# Patient Record
Sex: FEMALE | Race: WHITE | NOT HISPANIC OR LATINO | Employment: OTHER | URBAN - METROPOLITAN AREA
[De-identification: names, ages, dates, MRNs, and addresses within clinical notes are randomized per-mention and may not be internally consistent; named-entity substitution may affect disease eponyms.]

---

## 2017-06-13 ENCOUNTER — TRANSCRIBE ORDERS (OUTPATIENT)
Dept: ADMINISTRATIVE | Facility: HOSPITAL | Age: 52
End: 2017-06-13

## 2017-06-13 ENCOUNTER — HOSPITAL ENCOUNTER (OUTPATIENT)
Dept: CARDIOLOGY | Facility: HOSPITAL | Age: 52
Discharge: HOME | End: 2017-06-13
Attending: SURGERY | Admitting: SURGERY

## 2017-06-13 DIAGNOSIS — Z01.818 PRE-OP EXAMINATION: Primary | ICD-10-CM

## 2017-06-13 DIAGNOSIS — Z01.818 PRE-OP EXAMINATION: ICD-10-CM

## 2017-06-13 PROCEDURE — 93005 ELECTROCARDIOGRAM TRACING: CPT | Performed by: SURGERY

## 2017-06-13 PROCEDURE — 93010 ELECTROCARDIOGRAM REPORT: CPT | Performed by: INTERNAL MEDICINE

## 2020-12-16 ENCOUNTER — TELEPHONE (OUTPATIENT)
Dept: INTERNAL MEDICINE | Facility: CLINIC | Age: 55
End: 2020-12-16

## 2021-02-26 RX ORDER — PHENTERMINE HYDROCHLORIDE 37.5 MG/1
37.5 TABLET ORAL DAILY
OUTPATIENT
Start: 2021-02-26

## 2021-03-01 RX ORDER — PHENTERMINE HYDROCHLORIDE 37.5 MG/1
37.5 TABLET ORAL DAILY
OUTPATIENT
Start: 2021-03-01

## 2021-03-01 NOTE — TELEPHONE ENCOUNTER
Caller: Jessa Chang    Relationship: Self    Best call back number: [phone]    Medication needed:   Requested Prescriptions     Pending Prescriptions Disp Refills   • phentermine (ADIPEX-P) 37.5 MG tablet        Sig: Take 1 tablet by mouth Daily.       When do you need the refill by: ASAP    What details did the patient provide when requesting the medication: PT STATED SHE HAS 1 DAY LEFT. PT STA THAT SHE IS SCHEDULED FOR AN APPT ON 3-24-21 AND SHE JUST NEEDS IT FOR 1 MORE MONTH    Does the patient have less than a 3 day supply:  [x] Yes  [] No    What is the patient's preferred pharmacy: [facility]/PHARMACY #[id number] [place], [place] - [address] AT [facility] [phone] [facility] [phone]

## 2021-03-03 ENCOUNTER — TELEMEDICINE (OUTPATIENT)
Dept: INTERNAL MEDICINE | Facility: CLINIC | Age: 56
End: 2021-03-03

## 2021-03-03 DIAGNOSIS — E78.2 MIXED HYPERLIPIDEMIA: Primary | ICD-10-CM

## 2021-03-03 DIAGNOSIS — R63.5 WEIGHT GAIN: ICD-10-CM

## 2021-03-03 PROCEDURE — 99214 OFFICE O/P EST MOD 30 MIN: CPT | Performed by: INTERNAL MEDICINE

## 2021-03-03 RX ORDER — PHENTERMINE HYDROCHLORIDE 37.5 MG/1
37.5 TABLET ORAL DAILY
Qty: 30 TABLET | Refills: 0 | Status: SHIPPED | OUTPATIENT
Start: 2021-03-03 | End: 2021-03-24 | Stop reason: SDUPTHER

## 2021-03-03 NOTE — PROGRESS NOTES
Chief Complaint  No chief complaint on file.    Annamaria Chang presents to [facility]  History of Present Illness  Overall things are going pretty well.  She is still having some difficulty with weight gain.  She had Covid several months ago and got pretty ill from that.  It took several weeks to recover.  She has gained weight with the Covid quarantine.  She is interested in doing the phentermine again which she has done well in the past.  Her blood pressures been good at home, she has a  with chronic kidney disease and she is familiar with testing the blood pressure.  I did encourage her to come in for physical at the end of the month because we need to monitor this and her lab work.You have chosen to receive care through a telephone visit.You have chosen to receive care through a telehealth visit.  Do you consent to use a video/audio connection for your medical care today? Yes.    Objective   Vital Signs:   There were no vitals taken for this visit.    Physical Exam very nice lady in no acute distress.  Conversive and understanding of goal of treatment  Result Review : Reviewed previous records from [facility]                Assessment and Plan weight gain.  Underlying hyperlipidemia.  Blood pressure is good at home.  Phentermine prescription given.  She is aware of the potential risk and has taken in the past.  She has a physical scheduled for March 24.  Need to get her lab work and other care gaps completed.  Part of this note may be an electronic transcription/translation of spoken language to printed text using the Dragon Dictation System  Diagnoses and all orders for this visit:    1. Mixed hyperlipidemia (Primary)  -     phentermine (ADIPEX-P) 37.5 MG tablet; Take 1 tablet by mouth Daily.  Dispense: 30 tablet; Refill: 0    2. Weight gain        Follow Up   Return if symptoms worsen or fail to improve.  Patient was given instructions  and counseling regarding her condition or for health maintenance advice. Please see specific information pulled into the AVS if appropriate.

## 2021-03-12 RX ORDER — LAMOTRIGINE 100 MG/1
TABLET ORAL
Qty: 75 TABLET | Refills: 3 | Status: SHIPPED | OUTPATIENT
Start: 2021-03-12 | End: 2021-11-01

## 2021-03-24 ENCOUNTER — OFFICE VISIT (OUTPATIENT)
Dept: INTERNAL MEDICINE | Facility: CLINIC | Age: 56
End: 2021-03-24

## 2021-03-24 VITALS
BODY MASS INDEX: 28.32 KG/M2 | TEMPERATURE: 97.8 F | RESPIRATION RATE: 16 BRPM | DIASTOLIC BLOOD PRESSURE: 80 MMHG | SYSTOLIC BLOOD PRESSURE: 122 MMHG | HEART RATE: 97 BPM | HEIGHT: 64 IN | OXYGEN SATURATION: 96 %

## 2021-03-24 DIAGNOSIS — K21.9 GASTROESOPHAGEAL REFLUX DISEASE WITHOUT ESOPHAGITIS: ICD-10-CM

## 2021-03-24 DIAGNOSIS — Z12.11 COLON CANCER SCREENING: ICD-10-CM

## 2021-03-24 DIAGNOSIS — Z00.00 ROUTINE GENERAL MEDICAL EXAMINATION AT A HEALTH CARE FACILITY: ICD-10-CM

## 2021-03-24 DIAGNOSIS — R63.5 WEIGHT GAIN: ICD-10-CM

## 2021-03-24 DIAGNOSIS — E78.2 MIXED HYPERLIPIDEMIA: Primary | ICD-10-CM

## 2021-03-24 PROCEDURE — 99214 OFFICE O/P EST MOD 30 MIN: CPT | Performed by: INTERNAL MEDICINE

## 2021-03-24 RX ORDER — METHYLPREDNISOLONE 4 MG/1
TABLET ORAL
Qty: 1 EACH | Refills: 0 | Status: SHIPPED | OUTPATIENT
Start: 2021-03-24 | End: 2021-09-17 | Stop reason: ALTCHOICE

## 2021-03-24 RX ORDER — PANTOPRAZOLE SODIUM 40 MG/1
40 TABLET, DELAYED RELEASE ORAL DAILY
Qty: 30 TABLET | Refills: 1 | Status: SHIPPED | OUTPATIENT
Start: 2021-03-24 | End: 2021-04-18

## 2021-03-24 RX ORDER — PHENTERMINE HYDROCHLORIDE 37.5 MG/1
37.5 TABLET ORAL DAILY
Qty: 30 TABLET | Refills: 0 | Status: SHIPPED | OUTPATIENT
Start: 2021-03-24 | End: 2021-04-05

## 2021-03-24 NOTE — PROGRESS NOTES
"Chief Complaint  Follow-up (weight loss, covid )    Subjective          Jessa Chang presents to [facility]  History of Present Illness  Denies any significant headache, shortness of breath or chest pain.  No visual changes.  Taking medication without complication.  She has a lot of different concerns.  Right lower lumbar radicular symptoms.  She is noticed over the past few days.  Was not interested in any imaging at this point  Reflux symptoms.  She is been working on weight loss and juicing a lot.  No postprandial pain or right upper quadrant pain.  Taking some Pepcid occasionally  Has been working on weight loss with the phentermine and dietary changes.    Objective   Vital Signs:   /80 (BP Location: Left arm, Patient Position: Sitting, Cuff Size: Large Adult)   Pulse 97   Temp 97.8 °F (36.6 °C) (Temporal)   Resp 16   Ht 162.6 cm (64\")   SpO2 96%   BMI 28.32 kg/m²     Physical Exam  Vitals and nursing note reviewed.   Constitutional:       Appearance: Normal appearance.   HENT:      Head: Normocephalic and atraumatic.   Cardiovascular:      Rate and Rhythm: Normal rate and regular rhythm.   Pulmonary:      Effort: Pulmonary effort is normal.      Breath sounds: Normal breath sounds.   Abdominal:      General: Abdomen is flat.      Palpations: Abdomen is soft.   Musculoskeletal:         General: No swelling or deformity.      Cervical back: Neck supple.      Right lower leg: No edema.      Left lower leg: No edema.   Skin:     General: Skin is warm.      Capillary Refill: Capillary refill takes less than 2 seconds.      Findings: No rash.   Neurological:      General: No focal deficit present.      Mental Status: She is alert and oriented to person, place, and time.        Result Review :                 Assessment and Plan reflux symptoms.  Does not sound biliary in nature but certainly if does not improve we will schedule an ultrasound of the " gallbladder.  Pantoprazole 40 mg daily and recheck in 4 weeks  Weight gain.  She is done well on the phentermine.  She is juicing and has lost quite a bit of weight here in the past month.  Mild blood pressure elevation today but not concerned to need to change the medications.  Recheck in 2 months  Lumbar radicular symptoms.  We talked about exercises.  She was not interested in imaging at this point but if it persist would certainly pursue that.  Medrol Dosepak given.  Follow-up if it does not improve.  She talked about seeing the orthopedist as well which either way is fine but would not let it go untreated or undamaged if it persist.  Schedule colonoscopy.  Reportedly had a mammogram per GYN  Diagnoses and all orders for this visit:    1. Mixed hyperlipidemia (Primary)  -     Hepatitis C Antibody  -     CBC & Differential  -     Comprehensive Metabolic Panel  -     TSH  -     Lipid Panel With / Chol / HDL Ratio  -     Vitamin D 25 Hydroxy  -     Hemoglobin A1c  -     phentermine (ADIPEX-P) 37.5 MG tablet; Take 1 tablet by mouth Daily.  Dispense: 30 tablet; Refill: 0    2. Weight gain  -     Hepatitis C Antibody  -     CBC & Differential  -     Comprehensive Metabolic Panel  -     TSH  -     Lipid Panel With / Chol / HDL Ratio  -     Vitamin D 25 Hydroxy  -     Hemoglobin A1c    3. Gastroesophageal reflux disease without esophagitis  -     Hepatitis C Antibody  -     CBC & Differential  -     Comprehensive Metabolic Panel  -     TSH  -     Lipid Panel With / Chol / HDL Ratio  -     Vitamin D 25 Hydroxy  -     Hemoglobin A1c    4. Routine general medical examination at a health care facility  -     Hepatitis C Antibody  -     CBC & Differential  -     Comprehensive Metabolic Panel  -     TSH  -     Lipid Panel With / Chol / HDL Ratio  -     Vitamin D 25 Hydroxy  -     Hemoglobin A1c    Other orders  -     methylPREDNISolone (MEDROL) 4 MG dose pack; Take as directed on package instructions.  Dispense: 1 each;  Refill: 0  -     pantoprazole (Protonix) 40 MG EC tablet; Take 1 tablet by mouth Daily.  Dispense: 30 tablet; Refill: 1        Follow Up   No follow-ups on file.  Patient was given instructions and counseling regarding her condition or for health maintenance advice. Please see specific information pulled into the AVS if appropriate.

## 2021-03-25 LAB
25(OH)D3+25(OH)D2 SERPL-MCNC: 33.6 NG/ML (ref 30–100)
ALBUMIN SERPL-MCNC: 4.5 G/DL (ref 3.5–5.2)
ALBUMIN/GLOB SERPL: 1.7 G/DL
ALP SERPL-CCNC: 97 U/L (ref 39–117)
ALT SERPL-CCNC: 25 U/L (ref 1–33)
AST SERPL-CCNC: 20 U/L (ref 1–32)
BASOPHILS # BLD AUTO: 0.07 10*3/MM3 (ref 0–0.2)
BASOPHILS NFR BLD AUTO: 0.8 % (ref 0–1.5)
BILIRUB SERPL-MCNC: 0.2 MG/DL (ref 0–1.2)
BUN SERPL-MCNC: 10 MG/DL (ref 6–20)
BUN/CREAT SERPL: 11.9 (ref 7–25)
CALCIUM SERPL-MCNC: 9.9 MG/DL (ref 8.6–10.5)
CHLORIDE SERPL-SCNC: 103 MMOL/L (ref 98–107)
CHOLEST SERPL-MCNC: 241 MG/DL (ref 0–200)
CHOLEST/HDLC SERPL: 4.82 {RATIO}
CO2 SERPL-SCNC: 25.9 MMOL/L (ref 22–29)
CREAT SERPL-MCNC: 0.84 MG/DL (ref 0.57–1)
EOSINOPHIL # BLD AUTO: 0.24 10*3/MM3 (ref 0–0.4)
EOSINOPHIL NFR BLD AUTO: 2.9 % (ref 0.3–6.2)
ERYTHROCYTE [DISTWIDTH] IN BLOOD BY AUTOMATED COUNT: 12.8 % (ref 12.3–15.4)
GLOBULIN SER CALC-MCNC: 2.6 GM/DL
GLUCOSE SERPL-MCNC: 87 MG/DL (ref 65–99)
HBA1C MFR BLD: 5.5 % (ref 4.8–5.6)
HCT VFR BLD AUTO: 40.5 % (ref 34–46.6)
HCV AB S/CO SERPL IA: <0.1 S/CO RATIO (ref 0–0.9)
HDLC SERPL-MCNC: 50 MG/DL (ref 40–60)
HGB BLD-MCNC: 13.8 G/DL (ref 12–15.9)
IMM GRANULOCYTES # BLD AUTO: 0.02 10*3/MM3 (ref 0–0.05)
IMM GRANULOCYTES NFR BLD AUTO: 0.2 % (ref 0–0.5)
LDLC SERPL CALC-MCNC: 162 MG/DL (ref 0–100)
LYMPHOCYTES # BLD AUTO: 2.96 10*3/MM3 (ref 0.7–3.1)
LYMPHOCYTES NFR BLD AUTO: 35.9 % (ref 19.6–45.3)
MCH RBC QN AUTO: 32.9 PG (ref 26.6–33)
MCHC RBC AUTO-ENTMCNC: 34.1 G/DL (ref 31.5–35.7)
MCV RBC AUTO: 96.7 FL (ref 79–97)
MONOCYTES # BLD AUTO: 0.8 10*3/MM3 (ref 0.1–0.9)
MONOCYTES NFR BLD AUTO: 9.7 % (ref 5–12)
NEUTROPHILS # BLD AUTO: 4.15 10*3/MM3 (ref 1.7–7)
NEUTROPHILS NFR BLD AUTO: 50.5 % (ref 42.7–76)
NRBC BLD AUTO-RTO: 0 /100 WBC (ref 0–0.2)
PLATELET # BLD AUTO: 213 10*3/MM3 (ref 140–450)
POTASSIUM SERPL-SCNC: 4.1 MMOL/L (ref 3.5–5.2)
PROT SERPL-MCNC: 7.1 G/DL (ref 6–8.5)
RBC # BLD AUTO: 4.19 10*6/MM3 (ref 3.77–5.28)
SODIUM SERPL-SCNC: 136 MMOL/L (ref 136–145)
TRIGL SERPL-MCNC: 161 MG/DL (ref 0–150)
TSH SERPL DL<=0.005 MIU/L-ACNC: 1.16 UIU/ML (ref 0.27–4.2)
VLDLC SERPL CALC-MCNC: 29 MG/DL (ref 5–40)
WBC # BLD AUTO: 8.24 10*3/MM3 (ref 3.4–10.8)

## 2021-04-04 DIAGNOSIS — E78.2 MIXED HYPERLIPIDEMIA: ICD-10-CM

## 2021-04-05 RX ORDER — PHENTERMINE HYDROCHLORIDE 37.5 MG/1
TABLET ORAL
Qty: 30 TABLET | Refills: 0 | Status: SHIPPED | OUTPATIENT
Start: 2021-04-05 | End: 2022-02-24

## 2021-04-18 RX ORDER — PANTOPRAZOLE SODIUM 40 MG/1
TABLET, DELAYED RELEASE ORAL
Qty: 30 TABLET | Refills: 1 | Status: SHIPPED | OUTPATIENT
Start: 2021-04-18 | End: 2021-05-28

## 2021-05-24 ENCOUNTER — PREP FOR SURGERY (OUTPATIENT)
Dept: SURGERY | Facility: SURGERY CENTER | Age: 56
End: 2021-05-24

## 2021-05-24 ENCOUNTER — OFFICE VISIT (OUTPATIENT)
Dept: GASTROENTEROLOGY | Facility: CLINIC | Age: 56
End: 2021-05-24

## 2021-05-24 VITALS
DIASTOLIC BLOOD PRESSURE: 70 MMHG | WEIGHT: 171.8 LBS | TEMPERATURE: 97.3 F | RESPIRATION RATE: 16 BRPM | SYSTOLIC BLOOD PRESSURE: 122 MMHG | OXYGEN SATURATION: 99 % | HEIGHT: 65 IN | HEART RATE: 105 BPM | BODY MASS INDEX: 28.62 KG/M2

## 2021-05-24 DIAGNOSIS — R11.2 NAUSEA AND VOMITING, INTRACTABILITY OF VOMITING NOT SPECIFIED, UNSPECIFIED VOMITING TYPE: ICD-10-CM

## 2021-05-24 DIAGNOSIS — R19.7 DIARRHEA, UNSPECIFIED TYPE: ICD-10-CM

## 2021-05-24 DIAGNOSIS — K21.9 GASTROESOPHAGEAL REFLUX DISEASE, UNSPECIFIED WHETHER ESOPHAGITIS PRESENT: ICD-10-CM

## 2021-05-24 DIAGNOSIS — R13.19 ESOPHAGEAL DYSPHAGIA: Primary | ICD-10-CM

## 2021-05-24 DIAGNOSIS — K21.9 GASTROESOPHAGEAL REFLUX DISEASE, UNSPECIFIED WHETHER ESOPHAGITIS PRESENT: Primary | Chronic | ICD-10-CM

## 2021-05-24 DIAGNOSIS — K59.09 CHRONIC CONSTIPATION: ICD-10-CM

## 2021-05-24 DIAGNOSIS — R13.19 ESOPHAGEAL DYSPHAGIA: ICD-10-CM

## 2021-05-24 DIAGNOSIS — Z12.11 COLON CANCER SCREENING: ICD-10-CM

## 2021-05-24 PROCEDURE — 99204 OFFICE O/P NEW MOD 45 MIN: CPT | Performed by: INTERNAL MEDICINE

## 2021-05-24 RX ORDER — SODIUM CHLORIDE 0.9 % (FLUSH) 0.9 %
3 SYRINGE (ML) INJECTION EVERY 12 HOURS SCHEDULED
Status: CANCELLED | OUTPATIENT
Start: 2021-05-24

## 2021-05-24 RX ORDER — SODIUM CHLORIDE, SODIUM LACTATE, POTASSIUM CHLORIDE, CALCIUM CHLORIDE 600; 310; 30; 20 MG/100ML; MG/100ML; MG/100ML; MG/100ML
30 INJECTION, SOLUTION INTRAVENOUS CONTINUOUS PRN
Status: CANCELLED | OUTPATIENT
Start: 2021-05-24

## 2021-05-24 RX ORDER — SODIUM CHLORIDE 0.9 % (FLUSH) 0.9 %
10 SYRINGE (ML) INJECTION AS NEEDED
Status: CANCELLED | OUTPATIENT
Start: 2021-05-24

## 2021-05-24 NOTE — PROGRESS NOTES
"Chief Complaint   Patient presents with   • Nausea   • Vomiting     History of Present Illness  Patient here for consultation for dysphagia, GERD, constipation and diarrhea. She vomits daily. She feels reflux.  EGD and colonoscopy 8 years ago negative. Also with epigastric pain.         Result Review :       CT ABDOMEN PELVIS WITH CONTRAST (01/31/2014 10:56)  Comprehensive Metabolic Panel (03/24/2021 14:38)  CBC & Differential (03/24/2021 14:38)      Vital Signs:   /70   Pulse 105   Temp 97.3 °F (36.3 °C)   Resp 16   Ht 165.1 cm (65\")   Wt 77.9 kg (171 lb 12.8 oz)   SpO2 99%   BMI 28.59 kg/m²     Body mass index is 28.59 kg/m².     Physical Exam  Vitals reviewed.   Constitutional:       Appearance: Normal appearance.   Abdominal:      General: Bowel sounds are normal. There is no distension.      Palpations: Abdomen is soft. Abdomen is not rigid. There is no mass or pulsatile mass.      Tenderness: There is no abdominal tenderness. There is no guarding or rebound.           Assessment and Plan    Diagnoses and all orders for this visit:    1. Gastroesophageal reflux disease, unspecified whether esophagitis present (Primary)  Comments:  chronic and worsening problem    2. Esophageal dysphagia  Comments:  New and undiagnosed problem    3. Diarrhea, unspecified type    4. Chronic constipation    5. Nausea and vomiting, intractability of vomiting not specified, unspecified vomiting type         BRIEF SUMMARY  Patient for consultation.  She is having new episodes of nausea and vomiting as well as new complaints of dysphagia.  These happen all day long.  She also has pain in the epigastric region as well as left lower quadrant on occasion.  She had as have a history of chronic constipation and takes MiraLAX.  She does have some intermittent diarrhea but this is infrequent.  We will proceed with an EGD and colonoscopy for further evaluation.  She also has a family history of gallbladder issues with her nausea " and vomiting we will check an ultrasound of her gallbladder.  If all is negative, consider cross-sectional imaging with CT but doubt pancreatic etiology or other cause at this point.    I have reviewed and confirmed the accuracy of the HPI and Assessment and Plan as documented by the APRN NALLELY Ramirez        Follow Up   No follow-ups on file.    There are no Patient Instructions on file for this visit.

## 2021-05-28 RX ORDER — PANTOPRAZOLE SODIUM 40 MG/1
TABLET, DELAYED RELEASE ORAL
Qty: 90 TABLET | Refills: 1 | Status: SHIPPED | OUTPATIENT
Start: 2021-05-28 | End: 2021-12-09

## 2021-06-09 ENCOUNTER — TELEPHONE (OUTPATIENT)
Dept: GASTROENTEROLOGY | Facility: CLINIC | Age: 56
End: 2021-06-09

## 2021-06-11 ENCOUNTER — OUTSIDE FACILITY SERVICE (OUTPATIENT)
Dept: GASTROENTEROLOGY | Facility: CLINIC | Age: 56
End: 2021-06-11

## 2021-06-11 PROCEDURE — 43239 EGD BIOPSY SINGLE/MULTIPLE: CPT | Performed by: INTERNAL MEDICINE

## 2021-06-11 PROCEDURE — 45380 COLONOSCOPY AND BIOPSY: CPT | Performed by: INTERNAL MEDICINE

## 2021-06-18 DIAGNOSIS — R85.7 ABNORMAL SMALL BOWEL BIOPSY: Primary | ICD-10-CM

## 2021-06-30 ENCOUNTER — TELEPHONE (OUTPATIENT)
Dept: GASTROENTEROLOGY | Facility: CLINIC | Age: 56
End: 2021-06-30

## 2021-07-26 ENCOUNTER — PATIENT MESSAGE (OUTPATIENT)
Dept: GASTROENTEROLOGY | Facility: CLINIC | Age: 56
End: 2021-07-26

## 2021-09-17 ENCOUNTER — OFFICE VISIT (OUTPATIENT)
Dept: INTERNAL MEDICINE | Facility: CLINIC | Age: 56
End: 2021-09-17

## 2021-09-17 VITALS
RESPIRATION RATE: 16 BRPM | HEIGHT: 65 IN | HEART RATE: 89 BPM | BODY MASS INDEX: 28.32 KG/M2 | SYSTOLIC BLOOD PRESSURE: 144 MMHG | TEMPERATURE: 96.9 F | WEIGHT: 170 LBS | OXYGEN SATURATION: 97 % | DIASTOLIC BLOOD PRESSURE: 86 MMHG

## 2021-09-17 DIAGNOSIS — K58.2 IRRITABLE BOWEL SYNDROME WITH BOTH CONSTIPATION AND DIARRHEA: ICD-10-CM

## 2021-09-17 DIAGNOSIS — F41.9 ANXIETY: ICD-10-CM

## 2021-09-17 DIAGNOSIS — F33.9 EPISODE OF RECURRENT MAJOR DEPRESSIVE DISORDER, UNSPECIFIED DEPRESSION EPISODE SEVERITY (HCC): ICD-10-CM

## 2021-09-17 DIAGNOSIS — K21.9 GASTROESOPHAGEAL REFLUX DISEASE WITHOUT ESOPHAGITIS: ICD-10-CM

## 2021-09-17 DIAGNOSIS — R10.11 RUQ PAIN: Primary | ICD-10-CM

## 2021-09-17 PROCEDURE — 99214 OFFICE O/P EST MOD 30 MIN: CPT | Performed by: INTERNAL MEDICINE

## 2021-09-17 RX ORDER — PROMETHAZINE HYDROCHLORIDE 25 MG/1
1 TABLET ORAL DAILY
COMMUNITY

## 2021-09-17 NOTE — PROGRESS NOTES
"Chief Complaint  Abdominal Pain (follow up ), Anxiety (rf meds ), and Depression    Subjective          Jessa Chang presents to [facility]  History of Present Illness  Persistent bloating and dyspepsia.  Intermittent diarrhea with periods of constipation.  Certainly a long history of IBS.  She had a EGD and colonoscopy which were essentially negative.  We had talked about getting an right upper quadrant ultrasound and the GI doctor talk to her about that as well  History of depression on fluoxetine and lamotrigine and occasional diazepam.  She previously got these through her psychiatrist but wants to change it over as her psychiatrist is moving.    Objective   Vital Signs:   /86 (BP Location: Left arm, Patient Position: Sitting, Cuff Size: Large Adult)   Pulse 89   Temp 96.9 °F (36.1 °C) (Temporal)   Resp 16   Ht 165.1 cm (65\")   Wt 77.1 kg (170 lb)   SpO2 97%   BMI 28.29 kg/m²     Physical Exam  Constitutional:       Appearance: Normal appearance.   HENT:      Head: Normocephalic and atraumatic.      Nose: Nose normal.   Eyes:      Pupils: Pupils are equal, round, and reactive to light.   Cardiovascular:      Rate and Rhythm: Normal rate and regular rhythm.      Heart sounds: No murmur heard.   No friction rub. No gallop.    Pulmonary:      Effort: Pulmonary effort is normal.      Breath sounds: Normal breath sounds.   Neurological:      General: No focal deficit present.      Mental Status: She is alert and oriented to person, place, and time.   Psychiatric:         Attention and Perception: She does not perceive auditory or visual hallucinations.         Mood and Affect: Mood normal.         Speech: Speech normal.         Behavior: Behavior normal.         Thought Content: Thought content normal.         Cognition and Memory: Cognition normal.         Judgment: Judgment normal.        Result Review : Reviewed previous labs              "   Assessment and Plan dyspepsia with a history of IBS.  She had a EGD and colonoscopy recently.  Schedule right upper quadrant ultrasound and probably should go ahead and do a HIDA scan if that turns out to be negative.  Surgical referral pending.  IBS history.  We talked about dietary modifications and she is going to try to work on that.  Depression history and anxiety.  She is doing pretty well on the current combination of medication.  Her psychiatrist is retiring and ask if we could take that over which we can.  Would follow-up in 3 months  Diagnoses and all orders for this visit:    1. RUQ pain (Primary)  -     US Gallbladder; Future    2. Gastroesophageal reflux disease without esophagitis    3. Irritable bowel syndrome with both constipation and diarrhea    4. Episode of recurrent major depressive disorder, unspecified depression episode severity (CMS/HCC)    5. Anxiety        Follow Up   Return in about 3 months (around 12/17/2021).  Patient was given instructions and counseling regarding her condition or for health maintenance advice. Please see specific information pulled into the AVS if appropriate.

## 2021-09-30 ENCOUNTER — HOSPITAL ENCOUNTER (OUTPATIENT)
Dept: ULTRASOUND IMAGING | Facility: HOSPITAL | Age: 56
End: 2021-09-30

## 2021-10-14 ENCOUNTER — HOSPITAL ENCOUNTER (OUTPATIENT)
Dept: ULTRASOUND IMAGING | Facility: HOSPITAL | Age: 56
Discharge: HOME | End: 2021-10-14
Admitting: INTERNAL MEDICINE

## 2021-10-14 DIAGNOSIS — R10.11 RUQ PAIN: ICD-10-CM

## 2021-10-14 PROCEDURE — 76705 ECHO EXAM OF ABDOMEN: CPT

## 2021-11-01 RX ORDER — LAMOTRIGINE 100 MG/1
TABLET ORAL
Qty: 225 TABLET | Refills: 0 | Status: SHIPPED | OUTPATIENT
Start: 2021-11-01 | End: 2022-01-02

## 2021-12-09 RX ORDER — PANTOPRAZOLE SODIUM 40 MG/1
TABLET, DELAYED RELEASE ORAL
Qty: 90 TABLET | Refills: 1 | Status: SHIPPED | OUTPATIENT
Start: 2021-12-09 | End: 2022-02-24

## 2022-01-02 RX ORDER — LAMOTRIGINE 100 MG/1
TABLET ORAL
Qty: 225 TABLET | Refills: 0 | Status: SHIPPED | OUTPATIENT
Start: 2022-01-02 | End: 2022-06-29

## 2022-01-03 ENCOUNTER — TELEPHONE (OUTPATIENT)
Dept: INTERNAL MEDICINE | Facility: CLINIC | Age: 57
End: 2022-01-03

## 2022-01-03 DIAGNOSIS — K58.2 IRRITABLE BOWEL SYNDROME WITH BOTH CONSTIPATION AND DIARRHEA: Primary | ICD-10-CM

## 2022-01-03 DIAGNOSIS — F41.9 ANXIETY: ICD-10-CM

## 2022-01-03 RX ORDER — DIAZEPAM 5 MG/1
5 TABLET ORAL 2 TIMES DAILY PRN
Qty: 60 TABLET | Refills: 0 | Status: SHIPPED | OUTPATIENT
Start: 2022-01-03 | End: 2022-05-27 | Stop reason: SDUPTHER

## 2022-01-03 RX ORDER — FLUOXETINE HYDROCHLORIDE 20 MG/1
CAPSULE ORAL
Qty: 270 CAPSULE | Refills: 1 | Status: SHIPPED | OUTPATIENT
Start: 2022-01-03 | End: 2022-01-05 | Stop reason: SDUPTHER

## 2022-01-03 NOTE — TELEPHONE ENCOUNTER
THE PATIENT WILL ALSO NEED THE diazePAM (VALIUM) 5 MG tablet CALLED IN. SHE IS GETTING ON A PLANE FOR WORK THIS UPCOMING Thursday AND WOULD LIKE A REFILL ON BOTH MEDICATIONS. PLEASE ADVISE.

## 2022-01-05 RX ORDER — FLUOXETINE HYDROCHLORIDE 20 MG/1
60 CAPSULE ORAL EVERY MORNING
Qty: 270 CAPSULE | Refills: 1 | Status: SHIPPED | OUTPATIENT
Start: 2022-01-05 | End: 2022-12-27 | Stop reason: SDUPTHER

## 2022-01-05 NOTE — TELEPHONE ENCOUNTER
It was sent in on the third but I resent it.  Will need follow-up for the next Valium refill because greater than 6 months

## 2022-01-05 NOTE — TELEPHONE ENCOUNTER
PATIENT IS CALLING IN SHE STATES PHARMACY IS TELLING HER THEY DID NOT RECEIVE SCRIPT FOR THE FLUOXETINE BUT THEY DID GET THE DIAZEPAM. CAN YOU RESEND PLEASE.      CONFIRMED PHARMACY  [id number] - [place], [place] - [address]. AT [facility] [phone] [facility] [phone]

## 2022-02-24 ENCOUNTER — TELEPHONE (OUTPATIENT)
Dept: INTERNAL MEDICINE | Facility: CLINIC | Age: 57
End: 2022-02-24

## 2022-02-24 NOTE — TELEPHONE ENCOUNTER
PATIENT CALLED AND STATES SHE HAS BRONCHITIS. SHE GETS THIS EVERY YEAR. SHE IS HAVING CHEST CONGESTION, SINUSES ARE CLOGGED, FACE HURTS. NO APPOINTMENTS AVAILABLE TODAY.  THIS STARTED ON Monday AND GOTTEN WORSE YESTERDAY    PLEASE CALL AND ADVISE [phone]    [id number] - [place], [place] - [address] [address]. AT [facility] - [phone]  - [phone]   [phone]

## 2022-05-11 DIAGNOSIS — K58.2 IRRITABLE BOWEL SYNDROME WITH BOTH CONSTIPATION AND DIARRHEA: ICD-10-CM

## 2022-05-11 DIAGNOSIS — F41.9 ANXIETY: ICD-10-CM

## 2022-05-11 RX ORDER — LAMOTRIGINE 100 MG/1
TABLET ORAL
Qty: 225 TABLET | Refills: 0 | OUTPATIENT
Start: 2022-05-11

## 2022-05-11 RX ORDER — DIAZEPAM 5 MG/1
TABLET ORAL
Qty: 60 TABLET | Refills: 0 | OUTPATIENT
Start: 2022-05-11

## 2022-05-27 DIAGNOSIS — F41.9 ANXIETY: ICD-10-CM

## 2022-05-27 DIAGNOSIS — K58.2 IRRITABLE BOWEL SYNDROME WITH BOTH CONSTIPATION AND DIARRHEA: ICD-10-CM

## 2022-05-27 RX ORDER — DIAZEPAM 5 MG/1
5 TABLET ORAL 2 TIMES DAILY PRN
Qty: 10 TABLET | Refills: 0 | Status: SHIPPED | OUTPATIENT
Start: 2022-05-27 | End: 2022-07-15 | Stop reason: SDUPTHER

## 2022-05-27 RX ORDER — LOSARTAN POTASSIUM 25 MG/1
25 TABLET ORAL DAILY
Qty: 30 TABLET | Refills: 0 | Status: SHIPPED | OUTPATIENT
Start: 2022-05-27 | End: 2022-06-29

## 2022-05-27 NOTE — TELEPHONE ENCOUNTER
PATIENT CALLED FOR MEDICATION REFILL OF  diazePAM (VALIUM) 5 MG tablet    SHE IS OUT OF MEDICATION. HER FATHER IN LAW HAS PASSED AWAY AND SHE ENDED UP AT THE [facility]. SHE WAS GIVEN BLOOD PRESSURE MEDICATION   LOSARTAN 25 MG FOR CHEST PAINS    SHE IS REQUESTING A REFILL FOR BOTH MEDICATIONS.     [facility]/pharmacy #[id number] - [place], [place] - [address] [address]. AT [facility] - [phone]  - [phone]   [phone]    CALL BACK NUMBER [phone]

## 2022-05-27 NOTE — TELEPHONE ENCOUNTER
Have sent a short term Rx for valium, she has not been seen in over 6 mos and for controleld substances she has to be seen. Given the recent death in the family, will agree for a few until she can see Dr. Singer on 6/15, but this cannot be refilled again  Her ER visit was at the beginning of April, so we really like to have follow up within the 30 day after those visits which is why they only give 1 month in the ER. Again, have sent 1 month to get her through, but needs to keep this follow up appt next month

## 2022-05-27 NOTE — TELEPHONE ENCOUNTER
Rx Refill Note  Requested Prescriptions     Pending Prescriptions Disp Refills   • diazePAM (VALIUM) 5 MG tablet 60 tablet 0     Sig: Take 1 tablet by mouth 2 (Two) Times a Day As Needed for Anxiety.      Last office visit with prescribing clinician: 9/17/2021      Next office visit with prescribing clinician: Visit date not found            Dora Pittman MA  05/27/22, 15:12 EDT

## 2022-06-29 RX ORDER — LAMOTRIGINE 100 MG/1
TABLET ORAL
Qty: 225 TABLET | Refills: 0 | Status: SHIPPED | OUTPATIENT
Start: 2022-06-29 | End: 2022-09-13 | Stop reason: SDUPTHER

## 2022-06-29 RX ORDER — LOSARTAN POTASSIUM 25 MG/1
TABLET ORAL
Qty: 30 TABLET | Refills: 0 | Status: SHIPPED | OUTPATIENT
Start: 2022-06-29 | End: 2022-07-15 | Stop reason: ALTCHOICE

## 2022-06-29 NOTE — TELEPHONE ENCOUNTER
Caller: CharleneJessaMargarita    Relationship: Self    Best call back number: [phone] (H)    Requested Prescriptions:   Requested Prescriptions     Pending Prescriptions Disp Refills   • lamoTRIgine (LaMICtal) 100 MG tablet [Pharmacy Med Name: LAMOTRIGINE 100 MG TABLET] 225 tablet 0     Sig: TAKE 2 AND 1/2 TABLETS BY MOUTH EVERY MORNING        Pharmacy where request should be sent: [facility]/PHARMACY #[id number] [place], [place] - [address] [facility]. AT [facility] [phone] [facility] [phone]      Additional details provided by patient: PATIENT CALLED TO REQUEST A MEDICATION REFILL ON HER MEDICATION. PATIENT STATES THAT HE HAS A 3 DAY SUPPLY LEFT. PATIENT HAS AN APPOINTMENT SCHEDULED ON 07/15/22 WITH DR. DAVIDSON FOR A MEDICATION REFILL.          Does the patient have less than a 3 day supply:  [] Yes  [x] No    Nani Ruiz Rep   06/29/22 13:52 EDT         THANKS

## 2022-07-15 ENCOUNTER — OFFICE VISIT (OUTPATIENT)
Dept: INTERNAL MEDICINE | Facility: CLINIC | Age: 57
End: 2022-07-15

## 2022-07-15 VITALS
OXYGEN SATURATION: 97 % | HEIGHT: 64 IN | SYSTOLIC BLOOD PRESSURE: 160 MMHG | BODY MASS INDEX: 28.85 KG/M2 | WEIGHT: 169 LBS | HEART RATE: 91 BPM | TEMPERATURE: 97.3 F | RESPIRATION RATE: 16 BRPM | DIASTOLIC BLOOD PRESSURE: 110 MMHG

## 2022-07-15 DIAGNOSIS — K58.2 IRRITABLE BOWEL SYNDROME WITH BOTH CONSTIPATION AND DIARRHEA: ICD-10-CM

## 2022-07-15 DIAGNOSIS — R07.9 CHEST PAIN, UNSPECIFIED TYPE: ICD-10-CM

## 2022-07-15 DIAGNOSIS — Z00.00 ANNUAL PHYSICAL EXAM: ICD-10-CM

## 2022-07-15 DIAGNOSIS — F41.9 ANXIETY: ICD-10-CM

## 2022-07-15 DIAGNOSIS — I16.0 HYPERTENSIVE URGENCY: Primary | ICD-10-CM

## 2022-07-15 DIAGNOSIS — E78.2 MIXED HYPERLIPIDEMIA: ICD-10-CM

## 2022-07-15 DIAGNOSIS — R63.5 WEIGHT GAIN: ICD-10-CM

## 2022-07-15 PROCEDURE — 99214 OFFICE O/P EST MOD 30 MIN: CPT | Performed by: INTERNAL MEDICINE

## 2022-07-15 RX ORDER — LOSARTAN POTASSIUM AND HYDROCHLOROTHIAZIDE 12.5; 5 MG/1; MG/1
1 TABLET ORAL DAILY
Qty: 90 TABLET | Refills: 2 | Status: SHIPPED | OUTPATIENT
Start: 2022-07-15 | End: 2022-12-27 | Stop reason: SDUPTHER

## 2022-07-15 RX ORDER — PANTOPRAZOLE SODIUM 40 MG/1
40 TABLET, DELAYED RELEASE ORAL DAILY
COMMUNITY
Start: 2022-04-06

## 2022-07-15 RX ORDER — DIAZEPAM 5 MG/1
5 TABLET ORAL 2 TIMES DAILY PRN
Qty: 30 TABLET | Refills: 3 | Status: SHIPPED | OUTPATIENT
Start: 2022-07-15 | End: 2022-12-27 | Stop reason: SDUPTHER

## 2022-07-15 RX ORDER — LOSARTAN POTASSIUM 25 MG/1
25 TABLET ORAL DAILY
COMMUNITY
Start: 2022-04-07 | End: 2022-07-15 | Stop reason: ALTCHOICE

## 2022-07-15 NOTE — PROGRESS NOTES
"Chief Complaint  Hypertension    Subjective        Jessa Chang presents to [facility]  History of Present Illness  She has several different concerns.  First is some short-term memory loss.  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  She feels like her executive function is normal.  Poor balance which traditionally she says she has had good balance although has difficulty balancing on 1 foot.  She has not been working on exercise per se and realizes that that may help and has recently signed up for some classes to help with that.  We talked about some other options to work with balance as well.  No ataxia.  No double vision or cerebellar upper extremity findings  Her blood pressures been significantly elevated.  She has gained some weight she noted since last visit.  She is on a low-dose of losartan.  She thinks it is gotten worse since she has had COVID and she is had COVID 3 times.  She is not had any additional work-up for that.  As far as her anxiety is pretty well controlled on the lamotrigine and fluoxetine but she does take Valium and wanted a refill on that.  Objective   Vital Signs:  BP (!) 160/110 (BP Location: Left arm, Patient Position: Sitting, Cuff Size: Large Adult)   Pulse 91   Temp 97.3 °F (36.3 °C) (Temporal)   Resp 16   Ht 162.6 cm (64\")   Wt 76.7 kg (169 lb)   SpO2 97%   BMI 29.01 kg/m²   Estimated body mass index is 29.01 kg/m² as calculated from the following:    Height as of this encounter: 162.6 cm (64\").    Weight as of this encounter: 76.7 kg (169 lb).          Physical Exam  Vitals and nursing note reviewed.   Constitutional:       Appearance: Normal appearance.   HENT:      Head: Normocephalic and atraumatic.   Cardiovascular:      Rate and Rhythm: Normal rate and regular rhythm.   Pulmonary:      Effort: Pulmonary effort is normal.      Breath sounds: Normal breath sounds.   Abdominal: "      General: Abdomen is flat.      Palpations: Abdomen is soft.   Musculoskeletal:         General: No swelling or deformity.      Cervical back: Neck supple.      Right lower leg: No edema.      Left lower leg: No edema.   Skin:     General: Skin is warm.      Capillary Refill: Capillary refill takes less than 2 seconds.      Findings: No rash.   Neurological:      General: No focal deficit present.      Mental Status: She is alert and oriented to person, place, and time.      Cranial Nerves: No cranial nerve deficit.      Sensory: No sensory deficit.      Motor: No weakness.      Coordination: Coordination normal.      Gait: Gait normal.      Deep Tendon Reflexes: Reflexes normal.   Psychiatric:         Mood and Affect: Mood normal.         Behavior: Behavior normal.        Result Review :           Previous notes reviewed     Assessment and Plan   Diagnoses and all orders for this visit:    1. Hypertensive urgency (Primary)    2. Irritable bowel syndrome with both constipation and diarrhea  -     diazePAM (VALIUM) 5 MG tablet; Take 1 tablet by mouth 2 (Two) Times a Day As Needed for Anxiety.  Dispense: 30 tablet; Refill: 3  -     CBC & Differential  -     Comprehensive Metabolic Panel  -     TSH  -     Lipid Panel With / Chol / HDL Ratio  -     Vitamin D 25 Hydroxy  -     Hemoglobin A1c  -     Urinalysis With Microscopic - Urine, Clean Catch  -     C-reactive protein  -     Vitamin B12    3. Anxiety  -     diazePAM (VALIUM) 5 MG tablet; Take 1 tablet by mouth 2 (Two) Times a Day As Needed for Anxiety.  Dispense: 30 tablet; Refill: 3  -     CBC & Differential  -     Comprehensive Metabolic Panel  -     TSH  -     Lipid Panel With / Chol / HDL Ratio  -     Vitamin D 25 Hydroxy  -     Hemoglobin A1c  -     Urinalysis With Microscopic - Urine, Clean Catch  -     C-reactive protein  -     Vitamin B12    4. Mixed hyperlipidemia  -     CBC & Differential  -     Comprehensive Metabolic Panel  -     TSH  -     Lipid  Panel With / Chol / HDL Ratio  -     Vitamin D 25 Hydroxy  -     Hemoglobin A1c  -     Urinalysis With Microscopic - Urine, Clean Catch  -     C-reactive protein  -     Vitamin B12    5. Annual physical exam  -     CBC & Differential  -     Comprehensive Metabolic Panel  -     TSH  -     Lipid Panel With / Chol / HDL Ratio  -     Vitamin D 25 Hydroxy  -     Hemoglobin A1c  -     Urinalysis With Microscopic - Urine, Clean Catch  -     C-reactive protein  -     Vitamin B12    6. Weight gain  -     CBC & Differential  -     Comprehensive Metabolic Panel  -     TSH  -     Lipid Panel With / Chol / HDL Ratio  -     Vitamin D 25 Hydroxy  -     Hemoglobin A1c  -     Urinalysis With Microscopic - Urine, Clean Catch  -     C-reactive protein  -     Vitamin B12    7. Chest pain, unspecified type  -     NM HIDA Scan With Pharmacological Intervention; Future  -     Adult Transthoracic Echo Complete W/ Cont if Necessary Per Protocol; Future  -     Duplex Renal Artery - Bilateral Complete CAR; Future    Some short-term memory loss.  Her executive function certainly appears intact.  I will think there is any signs of early dementia.  We talked about neuroimaging but I think she wants to wait on that for now.  Check B12 and thyroid.  She is also having a little bit of difficulty with balance and we talked about balance exercises.  She is going to start some classes for that and strengthening will help.  Nicholas chi might be an option as well.  No real ataxia that I can detect on her exam and no cerebellar findings  Hypertension pretty significant.  Increase her losartan and add iffcgipbzfxqwlmbhev27.5 mg daily.  Check 2D echocardiogram and renal arterial Doppler and follow-up 4 weeks.  Continue working on exercise and diet  Anxiety overall stable.  We did refill her Valium.  Continue other medications.  History of abdominal pain and vomiting.  It sounds like chronic cholecystitis.  She had EGD and colonoscopy last year.  Schedule HIDA  scan and follow-up         Follow Up   No follow-ups on file.  Patient was given instructions and counseling regarding her condition or for health maintenance advice. Please see specific information pulled into the AVS if appropriate.

## 2022-07-16 DIAGNOSIS — J84.9 INTERSTITIAL LUNG DISEASE: Primary | ICD-10-CM

## 2022-07-16 LAB
25(OH)D3+25(OH)D2 SERPL-MCNC: 29.2 NG/ML (ref 30–100)
ALBUMIN SERPL-MCNC: 4.6 G/DL (ref 3.8–4.9)
ALBUMIN/GLOB SERPL: 1.7 {RATIO} (ref 1.2–2.2)
ALP SERPL-CCNC: 99 IU/L (ref 44–121)
ALT SERPL-CCNC: 21 IU/L (ref 0–32)
APPEARANCE UR: CLEAR
AST SERPL-CCNC: 24 IU/L (ref 0–40)
BACTERIA #/AREA URNS HPF: NORMAL /[HPF]
BASOPHILS # BLD AUTO: 0.1 X10E3/UL (ref 0–0.2)
BASOPHILS NFR BLD AUTO: 1 %
BILIRUB SERPL-MCNC: 0.4 MG/DL (ref 0–1.2)
BILIRUB UR QL STRIP: NEGATIVE
BUN SERPL-MCNC: 7 MG/DL (ref 6–24)
BUN/CREAT SERPL: 8 (ref 9–23)
CALCIUM SERPL-MCNC: 9.6 MG/DL (ref 8.7–10.2)
CASTS URNS QL MICRO: NORMAL /LPF
CHLORIDE SERPL-SCNC: 100 MMOL/L (ref 96–106)
CHOLEST SERPL-MCNC: 242 MG/DL (ref 100–199)
CHOLEST/HDLC SERPL: 3.9 RATIO (ref 0–4.4)
CO2 SERPL-SCNC: 21 MMOL/L (ref 20–29)
COLOR UR: YELLOW
CREAT SERPL-MCNC: 0.85 MG/DL (ref 0.57–1)
CRP SERPL-MCNC: 3 MG/L (ref 0–10)
EGFRCR SERPLBLD CKD-EPI 2021: 80 ML/MIN/1.73
EOSINOPHIL # BLD AUTO: 0.1 X10E3/UL (ref 0–0.4)
EOSINOPHIL NFR BLD AUTO: 2 %
EPI CELLS #/AREA URNS HPF: NORMAL /HPF (ref 0–10)
ERYTHROCYTE [DISTWIDTH] IN BLOOD BY AUTOMATED COUNT: 12.1 % (ref 11.7–15.4)
GLOBULIN SER CALC-MCNC: 2.7 G/DL (ref 1.5–4.5)
GLUCOSE SERPL-MCNC: 99 MG/DL (ref 65–99)
GLUCOSE UR QL STRIP: NEGATIVE
HBA1C MFR BLD: 5.5 % (ref 4.8–5.6)
HCT VFR BLD AUTO: 42.6 % (ref 34–46.6)
HDLC SERPL-MCNC: 62 MG/DL
HGB BLD-MCNC: 14.4 G/DL (ref 11.1–15.9)
HGB UR QL STRIP: NEGATIVE
IMM GRANULOCYTES # BLD AUTO: 0 X10E3/UL (ref 0–0.1)
IMM GRANULOCYTES NFR BLD AUTO: 0 %
KETONES UR QL STRIP: NEGATIVE
LDLC SERPL CALC-MCNC: 157 MG/DL (ref 0–99)
LEUKOCYTE ESTERASE UR QL STRIP: NEGATIVE
LYMPHOCYTES # BLD AUTO: 3.2 X10E3/UL (ref 0.7–3.1)
LYMPHOCYTES NFR BLD AUTO: 39 %
MCH RBC QN AUTO: 31.9 PG (ref 26.6–33)
MCHC RBC AUTO-ENTMCNC: 33.8 G/DL (ref 31.5–35.7)
MCV RBC AUTO: 94 FL (ref 79–97)
MICRO URNS: NORMAL
MICRO URNS: NORMAL
MONOCYTES # BLD AUTO: 0.6 X10E3/UL (ref 0.1–0.9)
MONOCYTES NFR BLD AUTO: 7 %
NEUTROPHILS # BLD AUTO: 4.1 X10E3/UL (ref 1.4–7)
NEUTROPHILS NFR BLD AUTO: 51 %
NITRITE UR QL STRIP: NEGATIVE
PH UR STRIP: 6 [PH] (ref 5–7.5)
PLATELET # BLD AUTO: 140 X10E3/UL (ref 150–450)
POTASSIUM SERPL-SCNC: 4.1 MMOL/L (ref 3.5–5.2)
PROT SERPL-MCNC: 7.3 G/DL (ref 6–8.5)
PROT UR QL STRIP: NORMAL
RBC # BLD AUTO: 4.52 X10E6/UL (ref 3.77–5.28)
RBC #/AREA URNS HPF: NORMAL /HPF (ref 0–2)
SODIUM SERPL-SCNC: 138 MMOL/L (ref 134–144)
SP GR UR STRIP: 1.02 (ref 1–1.03)
TRIGL SERPL-MCNC: 131 MG/DL (ref 0–149)
TSH SERPL DL<=0.005 MIU/L-ACNC: 1.92 UIU/ML (ref 0.45–4.5)
UROBILINOGEN UR STRIP-MCNC: 0.2 MG/DL (ref 0.2–1)
VIT B12 SERPL-MCNC: 235 PG/ML (ref 232–1245)
VLDLC SERPL CALC-MCNC: 23 MG/DL (ref 5–40)
WBC # BLD AUTO: 8.1 X10E3/UL (ref 3.4–10.8)
WBC #/AREA URNS HPF: NORMAL /HPF (ref 0–5)

## 2022-08-02 RX ORDER — LOSARTAN POTASSIUM 25 MG/1
TABLET ORAL
Qty: 30 TABLET | Refills: 0 | OUTPATIENT
Start: 2022-08-02

## 2022-08-12 ENCOUNTER — HOSPITAL ENCOUNTER (OUTPATIENT)
Dept: CARDIOLOGY | Facility: HOSPITAL | Age: 57
Discharge: HOME | End: 2022-08-12
Admitting: INTERNAL MEDICINE

## 2022-08-12 ENCOUNTER — HOSPITAL ENCOUNTER (OUTPATIENT)
Dept: NUCLEAR MEDICINE | Facility: HOSPITAL | Age: 57
Discharge: HOME | End: 2022-08-12

## 2022-08-12 ENCOUNTER — TELEPHONE (OUTPATIENT)
Dept: INTERNAL MEDICINE | Facility: CLINIC | Age: 57
End: 2022-08-12

## 2022-08-12 VITALS
DIASTOLIC BLOOD PRESSURE: 80 MMHG | WEIGHT: 169 LBS | SYSTOLIC BLOOD PRESSURE: 150 MMHG | HEART RATE: 60 BPM | HEIGHT: 64 IN | BODY MASS INDEX: 28.85 KG/M2

## 2022-08-12 DIAGNOSIS — R07.9 CHEST PAIN, UNSPECIFIED TYPE: ICD-10-CM

## 2022-08-12 LAB
ASCENDING AORTA: 2.6 CM
BH CV ECHO LEFT VENTRICLE GLOBAL LONGITUDINAL STRAIN: -14.7 %
BH CV ECHO MEAS - ACS: 1.9 CM
BH CV ECHO MEAS - AO MAX PG: 7 MMHG
BH CV ECHO MEAS - AO MEAN PG: 3.6 MMHG
BH CV ECHO MEAS - AO ROOT DIAM: 2.9 CM
BH CV ECHO MEAS - AO V2 MAX: 131.9 CM/SEC
BH CV ECHO MEAS - AO V2 VTI: 26.7 CM
BH CV ECHO MEAS - AVA(I,D): 1.94 CM2
BH CV ECHO MEAS - EDV(CUBED): 137.3 ML
BH CV ECHO MEAS - EDV(MOD-SP2): 111 ML
BH CV ECHO MEAS - EDV(MOD-SP4): 127 ML
BH CV ECHO MEAS - EF(MOD-BP): 45.3 %
BH CV ECHO MEAS - EF(MOD-SP2): 50.5 %
BH CV ECHO MEAS - EF(MOD-SP4): 40.9 %
BH CV ECHO MEAS - EF_3D-VOL: 42 %
BH CV ECHO MEAS - ESV(CUBED): 42.7 ML
BH CV ECHO MEAS - ESV(MOD-SP2): 55 ML
BH CV ECHO MEAS - ESV(MOD-SP4): 75 ML
BH CV ECHO MEAS - FS: 32.2 %
BH CV ECHO MEAS - IVS/LVPW: 1.03 CM
BH CV ECHO MEAS - IVSD: 0.93 CM
BH CV ECHO MEAS - LA 3D VOL INDEX: 2.6
BH CV ECHO MEAS - LAT PEAK E' VEL: 9.2 CM/SEC
BH CV ECHO MEAS - LV DIASTOLIC VOL/BSA (35-75): 69.7 CM2
BH CV ECHO MEAS - LV MASS(C)D: 169.8 GRAMS
BH CV ECHO MEAS - LV MAX PG: 2.8 MMHG
BH CV ECHO MEAS - LV MEAN PG: 1.41 MMHG
BH CV ECHO MEAS - LV SYSTOLIC VOL/BSA (12-30): 41.2 CM2
BH CV ECHO MEAS - LV V1 MAX: 84 CM/SEC
BH CV ECHO MEAS - LV V1 VTI: 16.9 CM
BH CV ECHO MEAS - LVIDD: 5.2 CM
BH CV ECHO MEAS - LVIDS: 3.5 CM
BH CV ECHO MEAS - LVOT AREA: 3.1 CM2
BH CV ECHO MEAS - LVOT DIAM: 1.97 CM
BH CV ECHO MEAS - LVPWD: 0.9 CM
BH CV ECHO MEAS - MED PEAK E' VEL: 9.2 CM/SEC
BH CV ECHO MEAS - MV A DUR: 0.11 SEC
BH CV ECHO MEAS - MV A MAX VEL: 73.7 CM/SEC
BH CV ECHO MEAS - MV DEC TIME: 0.21 MSEC
BH CV ECHO MEAS - MV E MAX VEL: 80.1 CM/SEC
BH CV ECHO MEAS - MV E/A: 1.09
BH CV ECHO MEAS - PA ACC TIME: 0.12 SEC
BH CV ECHO MEAS - PA PR(ACCEL): 26.7 MMHG
BH CV ECHO MEAS - PA V2 MAX: 52.7 CM/SEC
BH CV ECHO MEAS - PULM A REVS DUR: 0.09 SEC
BH CV ECHO MEAS - PULM A REVS VEL: 27 CM/SEC
BH CV ECHO MEAS - PULM DIAS VEL: 36.4 CM/SEC
BH CV ECHO MEAS - PULM S/D: 1.42
BH CV ECHO MEAS - PULM SYS VEL: 51.8 CM/SEC
BH CV ECHO MEAS - RAP SYSTOLE: 3 MMHG
BH CV ECHO MEAS - RV MAX PG: 3.2 MMHG
BH CV ECHO MEAS - RV V1 MAX: 89.2 CM/SEC
BH CV ECHO MEAS - RV V1 VTI: 17.7 CM
BH CV ECHO MEAS - RVSP: 26 MMHG
BH CV ECHO MEAS - SI(MOD-SP2): 30.8 ML/M2
BH CV ECHO MEAS - SI(MOD-SP4): 28.6 ML/M2
BH CV ECHO MEAS - SV(LVOT): 51.8 ML
BH CV ECHO MEAS - SV(MOD-SP2): 56 ML
BH CV ECHO MEAS - SV(MOD-SP4): 52 ML
BH CV ECHO MEAS - TAPSE (>1.6): 2.43 CM
BH CV ECHO MEAS - TR MAX PG: 23.6 MMHG
BH CV ECHO MEAS - TR MAX VEL: 243 CM/SEC
BH CV ECHO MEASUREMENTS AVERAGE E/E' RATIO: 8.71
BH CV XLRA - RV BASE: 3.1 CM
BH CV XLRA - RV LENGTH: 5.9 CM
BH CV XLRA - RV MID: 2.9 CM
BH CV XLRA - TDI S': 11 CM/SEC
LEFT ATRIUM VOLUME INDEX: 21.8 ML/M2
LV EF 2D ECHO EST: 42 %
MAXIMAL PREDICTED HEART RATE: 163 BPM
SINUS: 2.6 CM
STJ: 2.6 CM
STRESS TARGET HR: 139 BPM

## 2022-08-12 PROCEDURE — 25010000002 PERFLUTREN (DEFINITY) 8.476 MG IN SODIUM CHLORIDE (PF) 0.9 % 10 ML INJECTION: Performed by: INTERNAL MEDICINE

## 2022-08-12 PROCEDURE — 93356 MYOCRD STRAIN IMG SPCKL TRCK: CPT | Performed by: INTERNAL MEDICINE

## 2022-08-12 PROCEDURE — 93306 TTE W/DOPPLER COMPLETE: CPT | Performed by: INTERNAL MEDICINE

## 2022-08-12 PROCEDURE — 0 TECHNETIUM TC 99M MEBROFENIN KIT: Performed by: INTERNAL MEDICINE

## 2022-08-12 PROCEDURE — 93306 TTE W/DOPPLER COMPLETE: CPT

## 2022-08-12 PROCEDURE — A9537 TC99M MEBROFENIN: HCPCS | Performed by: INTERNAL MEDICINE

## 2022-08-12 PROCEDURE — 93356 MYOCRD STRAIN IMG SPCKL TRCK: CPT

## 2022-08-12 PROCEDURE — 78226 HEPATOBILIARY SYSTEM IMAGING: CPT

## 2022-08-12 RX ORDER — KIT FOR THE PREPARATION OF TECHNETIUM TC 99M MEBROFENIN 45 MG/10ML
1 INJECTION, POWDER, LYOPHILIZED, FOR SOLUTION INTRAVENOUS
Status: COMPLETED | OUTPATIENT
Start: 2022-08-12 | End: 2022-08-12

## 2022-08-12 RX ADMIN — SODIUM CHLORIDE 2 ML: 9 INJECTION INTRAMUSCULAR; INTRAVENOUS; SUBCUTANEOUS at 09:17

## 2022-08-12 RX ADMIN — MEBROFENIN 1 DOSE: 45 INJECTION, POWDER, LYOPHILIZED, FOR SOLUTION INTRAVENOUS at 09:20

## 2022-08-12 NOTE — TELEPHONE ENCOUNTER
Patient is scheduled for HIDA Scan the hospital is unable to do with the contract due to availability, and will be doing scan without contrast.

## 2022-08-18 ENCOUNTER — TELEPHONE (OUTPATIENT)
Dept: INTERNAL MEDICINE | Facility: CLINIC | Age: 57
End: 2022-08-18

## 2022-08-18 NOTE — TELEPHONE ENCOUNTER
Caller: Jessa Chang    Relationship: Self    Best call back number: [phone]    What is the best time to reach you: ANY TIME    Who are you requesting to speak with (clinical staff, provider,  specific staff member): JODY    What was the call regarding: PATIENT CALLED TO SEE WHEN SHE IS SUPPOSED TO FOLLOW UP WITH DR. DAVIDSON IN REGARDS TO HER HIDA SCAN. SHE RECEIVED A [employer] MESSAGE THAT TOLD HER SHE NEEDS A FOLLOW UP BUT IT DIDN'T STATE WHEN.    PLEASE ADVISE    Do you require a callback: YES

## 2022-08-23 ENCOUNTER — TELEPHONE (OUTPATIENT)
Dept: INTERNAL MEDICINE | Facility: CLINIC | Age: 57
End: 2022-08-23

## 2022-08-23 NOTE — TELEPHONE ENCOUNTER
I called and talked to the patient.  She is taking her medication.  I asked to follow-up to do a blood pressure check.  Also we talked about her echocardiogram which she does have a slightly decreased ejection fraction.  She sees cardiology next week but if she can get in the office to reevaluate prior to that would probably be good so we can titrate her medication if needed.

## 2022-08-31 ENCOUNTER — HOSPITAL ENCOUNTER (OUTPATIENT)
Dept: CT IMAGING | Facility: HOSPITAL | Age: 57
Discharge: HOME | End: 2022-08-31

## 2022-08-31 ENCOUNTER — HOSPITAL ENCOUNTER (OUTPATIENT)
Dept: CARDIOLOGY | Facility: HOSPITAL | Age: 57
Discharge: HOME | End: 2022-08-31

## 2022-08-31 DIAGNOSIS — R07.9 CHEST PAIN, UNSPECIFIED TYPE: ICD-10-CM

## 2022-08-31 DIAGNOSIS — J84.9 INTERSTITIAL LUNG DISEASE: ICD-10-CM

## 2022-08-31 LAB
BH CV ECHO MEAS - DIST REN A EDV LEFT: 29.3 CM/S
BH CV ECHO MEAS - DIST REN A PSV LEFT: 114 CM/S
BH CV ECHO MEAS - MID REN A EDV LEFT: 26.4 CM/S
BH CV ECHO MEAS - MID REN A PSV LEFT: 120 CM/S
BH CV ECHO MEAS - PROX REN A EDV LEFT: -30.1 CM/S
BH CV ECHO MEAS - PROX REN A PSV LEFT: -126 CM/S
BH CV VAS BP LEFT ARM: NORMAL MMHG
BH CV VAS BP RIGHT ARM: NORMAL MMHG
BH CV VAS RENAL AORTIC MID EDV: 12.8 CM/S
BH CV VAS RENAL AORTIC MID PSV: 83.7 CM/S
BH CV VAS RENAL HILUM LEFT EDV: 14.7 CM/S
BH CV VAS RENAL HILUM LEFT PSV: 46.7 CM/S
BH CV VAS RENAL HILUM RIGHT EDV: 20.4 CM/S
BH CV VAS RENAL HILUM RIGHT PSV: 62 CM/S
BH CV XLRA MEAS - KID L LEFT: 10.1 CM
BH CV XLRA MEAS DIST REN A EDV RIGHT: 16.6 CM/S
BH CV XLRA MEAS DIST REN A PSV RIGHT: 68.6 CM/S
BH CV XLRA MEAS KID L RIGHT: 10.1 CM
BH CV XLRA MEAS KID W RIGHT: 5.3 CM
BH CV XLRA MEAS MID REN A EDV RIGHT: 23.2 CM/S
BH CV XLRA MEAS MID REN A PSV RIGHT: 91.1 CM/S
BH CV XLRA MEAS PROX REN A EDV RIGHT: -25.7 CM/S
BH CV XLRA MEAS PROX REN A PSV RIGHT: -109 CM/S
BH CV XLRA MEAS RAR LEFT: 1.6
BH CV XLRA MEAS RAR RIGHT: 1.5
BH CV XLRA MEAS RENAL A ORG EDV LEFT: -32.8 CM/S
BH CV XLRA MEAS RENAL A ORG EDV RIGHT: 31.7 CM/S
BH CV XLRA MEAS RENAL A ORG PSV LEFT: -137 CM/S
BH CV XLRA MEAS RENAL A ORG PSV RIGHT: 126 CM/S
LEFT KIDNEY WIDTH: 5.2 CM
LEFT RENAL UPPER PARENCHYMA MAX: 23.5 CM/S
LEFT RENAL UPPER PARENCHYMA MIN: 8.52 CM/S
LEFT RENAL UPPER PARENCHYMA RI: 0.64
MAXIMAL PREDICTED HEART RATE: 163 BPM
RIGHT RENAL UPPER PARENCHYMA MAX: 17.9 CM/S
RIGHT RENAL UPPER PARENCHYMA MIN: 5.96 CM/S
RIGHT RENAL UPPER PARENCHYMA RI: 0.67
STRESS TARGET HR: 139 BPM

## 2022-08-31 PROCEDURE — 93975 VASCULAR STUDY: CPT

## 2022-08-31 PROCEDURE — 82565 ASSAY OF CREATININE: CPT

## 2022-08-31 PROCEDURE — 25010000002 IOPAMIDOL 61 % SOLUTION: Performed by: INTERNAL MEDICINE

## 2022-08-31 PROCEDURE — 71260 CT THORAX DX C+: CPT

## 2022-08-31 RX ADMIN — IOPAMIDOL 75 ML: 612 INJECTION, SOLUTION INTRAVENOUS at 09:23

## 2022-09-01 LAB — CREAT BLDA-MCNC: 0.9 MG/DL (ref 0.6–1.3)

## 2022-09-06 ENCOUNTER — TELEPHONE (OUTPATIENT)
Dept: INTERNAL MEDICINE | Facility: CLINIC | Age: 57
End: 2022-09-06

## 2022-09-06 DIAGNOSIS — I42.9 CARDIOMYOPATHY, UNSPECIFIED TYPE: Primary | ICD-10-CM

## 2022-09-06 NOTE — TELEPHONE ENCOUNTER
Caller: Jessa Chang    Relationship: Self    Best call back number: [phone]    What was the call regarding: PATIENT IS REQUESTING A CALL TO CLARIFY TEST RESULTS.     PLEASE CALL TO DISCUSS WITH PATIENT.      
Left message to follow up in office and putting in referral   
Patient is concerned about ejection fraction being around 40? I told her to come in and she said her blood pressure is fine and she needs to be set up with a specialist who specializes in heart failure   
So still needs follow up here correct. How urgent does she need to be seen here and with cardiology, she said you were very concerned and she didn't need to wait very long   
Yes she will also need a referral to cardiology.  I talked her about that a couple weeks ago  
no

## 2022-09-13 ENCOUNTER — TELEPHONE (OUTPATIENT)
Dept: INTERNAL MEDICINE | Facility: CLINIC | Age: 57
End: 2022-09-13

## 2022-09-13 RX ORDER — LAMOTRIGINE 100 MG/1
300 TABLET ORAL DAILY
Qty: 225 TABLET | Refills: 1 | Status: SHIPPED | OUTPATIENT
Start: 2022-09-13 | End: 2022-12-27 | Stop reason: SDUPTHER

## 2022-09-13 NOTE — TELEPHONE ENCOUNTER
Saw her back in July for hypertensive urgency.  Reportedly she is doing better on the losartan with hydrochlorothiazide but we have encouraged her to follow-up in the office for lab work and blood pressure recheck.  She had a reduced ejection fraction on her echocardiogram which could be postviral but needs work-up.  She has not followed up in the office so I talked to her again today and schedule appointment for cardiology.  Again recommended she follow-up in the office.  GI symptoms have persisted and she had a work-up from previous.  Also we can do celiac panel on follow-up.

## 2022-12-21 ENCOUNTER — TELEPHONE (OUTPATIENT)
Dept: INTERNAL MEDICINE | Facility: CLINIC | Age: 57
End: 2022-12-21

## 2022-12-21 NOTE — TELEPHONE ENCOUNTER
Caller: Jessa Chang    Relationship: Self    Best call back number: [phone]    What is the best time to reach you: AFTERNOON/EVENING     Who are you requesting to speak with (clinical staff, provider,  specific staff member): DR. DAVIDSON    What was the call regarding: PATIENT STATES SHE IS REQUESTING A CALL BACK FROM DR. DAVIDSON AND IT IS IN REGARDS TO HER GOING TO BE SEEN FOR HER HEART.

## 2022-12-22 DIAGNOSIS — F41.9 ANXIETY: ICD-10-CM

## 2022-12-22 DIAGNOSIS — K58.2 IRRITABLE BOWEL SYNDROME WITH BOTH CONSTIPATION AND DIARRHEA: ICD-10-CM

## 2022-12-22 NOTE — TELEPHONE ENCOUNTER
Rx Refill Note  Requested Prescriptions     Pending Prescriptions Disp Refills   • FLUoxetine (PROzac) 20 MG capsule [Pharmacy Med Name: FLUOXETINE HCL 20 MG CAPSULE] 270 capsule 1     Sig: TAKE 3 CAPSULES BY MOUTH EVERY MORNING.   • diazePAM (VALIUM) 5 MG tablet [Pharmacy Med Name: DIAZEPAM 5 MG TABLET] 30 tablet 3     Sig: TAKE 1 TABLET BY MOUTH 2 TIMES A DAY AS NEEDED FOR ANXIETY.      Last office visit with prescribing clinician: 7/15/2022   Last telemedicine visit with prescribing clinician: Visit date not found   Next office visit with prescribing clinician: Visit date not found                         Would you like a call back once the refill request has been completed: [] Yes [] No    If the office needs to give you a call back, can they leave a voicemail: [] Yes [] No    Dora Pittman MA  12/22/22, 13:01 EST

## 2022-12-23 RX ORDER — DIAZEPAM 5 MG/1
TABLET ORAL
Qty: 30 TABLET | Refills: 3 | OUTPATIENT
Start: 2022-12-23

## 2022-12-23 RX ORDER — FLUOXETINE HYDROCHLORIDE 20 MG/1
60 CAPSULE ORAL EVERY MORNING
Qty: 270 CAPSULE | Refills: 1 | OUTPATIENT
Start: 2022-12-23

## 2022-12-27 ENCOUNTER — OFFICE VISIT (OUTPATIENT)
Dept: INTERNAL MEDICINE | Facility: CLINIC | Age: 57
End: 2022-12-27

## 2022-12-27 VITALS
HEART RATE: 83 BPM | TEMPERATURE: 96.9 F | DIASTOLIC BLOOD PRESSURE: 88 MMHG | OXYGEN SATURATION: 97 % | RESPIRATION RATE: 16 BRPM | HEIGHT: 64 IN | BODY MASS INDEX: 29.01 KG/M2 | SYSTOLIC BLOOD PRESSURE: 136 MMHG

## 2022-12-27 DIAGNOSIS — I40.0 CHRONIC VIRAL MYOCARDITIS: ICD-10-CM

## 2022-12-27 DIAGNOSIS — F41.9 ANXIETY: ICD-10-CM

## 2022-12-27 DIAGNOSIS — K58.2 IRRITABLE BOWEL SYNDROME WITH BOTH CONSTIPATION AND DIARRHEA: ICD-10-CM

## 2022-12-27 DIAGNOSIS — U09.9 LONG COVID: Primary | ICD-10-CM

## 2022-12-27 PROCEDURE — 99214 OFFICE O/P EST MOD 30 MIN: CPT | Performed by: INTERNAL MEDICINE

## 2022-12-27 RX ORDER — FLUOXETINE HYDROCHLORIDE 20 MG/1
60 CAPSULE ORAL EVERY MORNING
Qty: 270 CAPSULE | Refills: 1 | Status: SHIPPED | OUTPATIENT
Start: 2022-12-27

## 2022-12-27 RX ORDER — LAMOTRIGINE 100 MG/1
300 TABLET ORAL DAILY
Qty: 225 TABLET | Refills: 1 | Status: SHIPPED | OUTPATIENT
Start: 2022-12-27

## 2022-12-27 RX ORDER — CARVEDILOL 3.12 MG/1
TABLET ORAL
COMMUNITY
Start: 2022-12-06

## 2022-12-27 RX ORDER — PROGESTERONE 100 MG/1
100 CAPSULE ORAL DAILY
COMMUNITY

## 2022-12-27 RX ORDER — DIAZEPAM 5 MG/1
5 TABLET ORAL 2 TIMES DAILY PRN
Qty: 30 TABLET | Refills: 3 | Status: SHIPPED | OUTPATIENT
Start: 2022-12-27

## 2022-12-27 RX ORDER — LOSARTAN POTASSIUM AND HYDROCHLOROTHIAZIDE 12.5; 5 MG/1; MG/1
1 TABLET ORAL DAILY
Qty: 90 TABLET | Refills: 2 | Status: SHIPPED | OUTPATIENT
Start: 2022-12-27

## 2022-12-27 NOTE — PROGRESS NOTES
"Chief Complaint  Follow-up (FROM SPECIALIST )    Subjective        Jessa Chang presents to [facility]  History of Present Illness  She had been seen in quite some time and getting ready go to [place] and wanted a refill on her diazepam.  More importantly she has had this reduced ejection fraction which finally she saw a cardiologist up in [place].  Reviewed her cardiac MRI which seems consistent with viral myocarditis and they added carvedilol.  Entresto was not discussed apparently.  She is feeling about the same.  She is not exercising but plans to get exercising more and work on weight loss.  Her brain fog is improved.  She did have a MRI and PET scan of the brain.  Also recommended she do a sleep study which she has not done yet and wants us to order that.  Objective   Vital Signs:  /88 (BP Location: Left arm, Patient Position: Sitting, Cuff Size: Large Adult)   Pulse 83   Temp 96.9 °F (36.1 °C) (Temporal)   Resp 16   Ht 162.6 cm (64\")   SpO2 97%   BMI 29.01 kg/m²   Estimated body mass index is 29.01 kg/m² as calculated from the following:    Height as of this encounter: 162.6 cm (64\").    Weight as of 8/12/22: 76.7 kg (169 lb).          Physical Exam  Psychiatric:         Mood and Affect: Mood normal.         Behavior: Behavior normal.         Thought Content: Thought content normal.         Judgment: Judgment normal.        Result Review :           Reviewed records from [place]     Assessment and Plan   Diagnoses and all orders for this visit:    1. Long COVID (Primary)    2. Irritable bowel syndrome with both constipation and diarrhea    3. Anxiety    4. Chronic viral myocarditis  Comments:  Cardiac MRI and echocardiogram 2022.  Cardiac MRI done at [place]    Presumed viral myocarditis from COVID.  Ejection fraction 40% on MRI up in [place].  They added carvedilol in addition to her losartan hydrochlorothiazide.  We talked about " exercise and dietary modification and weight reduction which she is going to work on.  Recheck the echo in at least 6 months.  Schedule sleep study with sleep medicine  Hyperlipidemia.  Work on diet and exercise and we will recheck that on follow-up as well we can add a statin if not to goal or closer to goal.  Anxiety disorder, refill on the fluoxetine and Valium prescription.  Recheck 3 months         Follow Up   Return in about 3 months (around 3/27/2023).  Patient was given instructions and counseling regarding her condition or for health maintenance advice. Please see specific information pulled into the AVS if appropriate.

## 2023-07-28 RX ORDER — LAMOTRIGINE 100 MG/1
300 TABLET ORAL DAILY
Qty: 225 TABLET | Refills: 1 | OUTPATIENT
Start: 2023-07-28

## 2023-07-29 RX ORDER — LAMOTRIGINE 100 MG/1
300 TABLET ORAL DAILY
Qty: 225 TABLET | Refills: 0 | Status: SHIPPED | OUTPATIENT
Start: 2023-07-29

## 2023-07-31 NOTE — TELEPHONE ENCOUNTER
Okay for hub to read*    Called and left message for patient requesting to schedule medication review appointment.

## 2023-08-17 DIAGNOSIS — K58.2 IRRITABLE BOWEL SYNDROME WITH BOTH CONSTIPATION AND DIARRHEA: ICD-10-CM

## 2023-08-17 DIAGNOSIS — F41.9 ANXIETY: ICD-10-CM

## 2023-08-18 RX ORDER — DIAZEPAM 5 MG/1
TABLET ORAL
Qty: 30 TABLET | OUTPATIENT
Start: 2023-08-18

## 2023-08-22 DIAGNOSIS — F41.9 ANXIETY: ICD-10-CM

## 2023-08-22 DIAGNOSIS — K58.2 IRRITABLE BOWEL SYNDROME WITH BOTH CONSTIPATION AND DIARRHEA: ICD-10-CM

## 2023-08-22 RX ORDER — DIAZEPAM 5 MG/1
5 TABLET ORAL 2 TIMES DAILY PRN
Qty: 30 TABLET | Refills: 3 | OUTPATIENT
Start: 2023-08-22

## 2023-08-22 NOTE — TELEPHONE ENCOUNTER
Caller: Jessa Chang    Relationship: Self    Best call back number: [phone]     Requested Prescriptions:   Requested Prescriptions     Pending Prescriptions Disp Refills    diazePAM (VALIUM) 5 MG tablet 30 tablet 3     Sig: Take 1 tablet by mouth 2 (Two) Times a Day As Needed for Anxiety.        Pharmacy where request should be sent: [id number] - [place], [place] - [address] [phone] [facility] [phone]      Last office visit with prescribing clinician: 12/27/2022   Last telemedicine visit with prescribing clinician: Visit date not found   Next office visit with prescribing clinician: 8/29/2023     Additional details provided by patient: PATIENT HAS MEDICATION REFILL APPOINTMENT SCHEDULED FOR 08/29/23 AND IS REQUESTING A REFILL FOR ENOUGH TO GET TO APPOINTMENT.     PATIENT IS OUT OF THIS MEDICATION.    Does the patient have less than a 3 day supply:  [x] Yes  [] No    Would you like a call back once the refill request has been completed: [] Yes [x] No    If the office needs to give you a call back, can they leave a voicemail: [x] Yes [] No    Nani Seth Rep   08/22/23 15:32 EDT

## 2023-08-29 ENCOUNTER — OFFICE VISIT (OUTPATIENT)
Dept: INTERNAL MEDICINE | Facility: CLINIC | Age: 58
End: 2023-08-29
Payer: COMMERCIAL

## 2023-08-29 VITALS
DIASTOLIC BLOOD PRESSURE: 62 MMHG | BODY MASS INDEX: 28.85 KG/M2 | HEART RATE: 59 BPM | RESPIRATION RATE: 16 BRPM | OXYGEN SATURATION: 97 % | TEMPERATURE: 97.1 F | SYSTOLIC BLOOD PRESSURE: 116 MMHG | HEIGHT: 64 IN | WEIGHT: 169 LBS

## 2023-08-29 DIAGNOSIS — I42.9 CARDIOMYOPATHY, UNSPECIFIED TYPE: Primary | ICD-10-CM

## 2023-08-29 DIAGNOSIS — F41.9 ANXIETY: ICD-10-CM

## 2023-08-29 DIAGNOSIS — K58.2 IRRITABLE BOWEL SYNDROME WITH BOTH CONSTIPATION AND DIARRHEA: ICD-10-CM

## 2023-08-29 PROCEDURE — 99214 OFFICE O/P EST MOD 30 MIN: CPT | Performed by: INTERNAL MEDICINE

## 2023-08-29 RX ORDER — FLUOXETINE HYDROCHLORIDE 20 MG/1
60 CAPSULE ORAL EVERY MORNING
Qty: 270 CAPSULE | Refills: 1 | Status: SHIPPED | OUTPATIENT
Start: 2023-08-29

## 2023-08-29 RX ORDER — LAMOTRIGINE 100 MG/1
300 TABLET ORAL DAILY
Qty: 225 TABLET | Refills: 1 | Status: SHIPPED | OUTPATIENT
Start: 2023-08-29

## 2023-08-29 RX ORDER — CYANOCOBALAMIN (VITAMIN B-12) 1000 MCG
TABLET ORAL
COMMUNITY

## 2023-08-29 RX ORDER — ESTERIFIED ESTROGEN AND METHYLTESTOSTERONE .625; 1.25 MG/1; MG/1
1 TABLET ORAL DAILY
COMMUNITY
Start: 2023-08-17

## 2023-08-29 RX ORDER — CARVEDILOL 6.25 MG/1
6.25 TABLET ORAL
COMMUNITY
Start: 2023-08-24

## 2023-08-29 RX ORDER — DIAZEPAM 5 MG/1
5 TABLET ORAL 2 TIMES DAILY PRN
Qty: 30 TABLET | Refills: 3 | Status: SHIPPED | OUTPATIENT
Start: 2023-08-29

## 2023-08-29 RX ORDER — SACUBITRIL AND VALSARTAN 49; 51 MG/1; MG/1
1 TABLET, FILM COATED ORAL 2 TIMES DAILY
COMMUNITY
Start: 2023-05-11

## 2023-08-29 RX ORDER — SPIRONOLACTONE 25 MG/1
25 TABLET ORAL DAILY
COMMUNITY
Start: 2023-06-23

## 2023-08-29 NOTE — PROGRESS NOTES
"Chief Complaint  Follow-up, Anxiety, and Med Refill    Subjective        Jessa Chang presents to [facility]  Anxiety        Follow-up of anxiety and needs a refill on Valium.  She doing well on the fluoxetine and lamotrigine.  Needs refills of those also.  She is seen by [facility] cardiology for presumed viral cardiomyopathy.  Last ejection fraction was 42%.  She is currently on Entresto, spironolactone and carvedilol.  She feels like her exercise tolerance is better  Objective   Vital Signs:  /62 (BP Location: Left arm, Patient Position: Sitting, Cuff Size: Large Adult)   Pulse 59   Temp 97.1 øF (36.2 øC) (Temporal)   Resp 16   Ht 162.6 cm (64\")   Wt 76.7 kg (169 lb)   SpO2 97%   BMI 29.01 kg/mý   Estimated body mass index is 29.01 kg/mý as calculated from the following:    Height as of this encounter: 162.6 cm (64\").    Weight as of this encounter: 76.7 kg (169 lb).             Physical Exam  Vitals and nursing note reviewed.   Constitutional:       Appearance: Normal appearance.   HENT:      Head: Normocephalic and atraumatic.   Cardiovascular:      Rate and Rhythm: Normal rate and regular rhythm.   Pulmonary:      Effort: Pulmonary effort is normal.      Breath sounds: Normal breath sounds.   Abdominal:      General: Abdomen is flat.      Palpations: Abdomen is soft.   Musculoskeletal:         General: No swelling or deformity.      Cervical back: Neck supple.      Right lower leg: No edema.      Left lower leg: No edema.   Skin:     General: Skin is warm.      Capillary Refill: Capillary refill takes less than 2 seconds.      Findings: No rash.   Neurological:      General: No focal deficit present.      Mental Status: She is alert and oriented to person, place, and time.      Result Review :        Previous notes reviewed           Assessment and Plan   Diagnoses and all orders for this visit:    1. Cardiomyopathy, unspecified type " (Primary)    2. Anxiety  -     diazePAM (VALIUM) 5 MG tablet; Take 1 tablet by mouth 2 (Two) Times a Day As Needed for Anxiety.  Dispense: 30 tablet; Refill: 3    3. Irritable bowel syndrome with both constipation and diarrhea  -     diazePAM (VALIUM) 5 MG tablet; Take 1 tablet by mouth 2 (Two) Times a Day As Needed for Anxiety.  Dispense: 30 tablet; Refill: 3    Other orders  -     FLUoxetine (PROzac) 20 MG capsule; Take 3 capsules by mouth Every Morning.  Dispense: 270 capsule; Refill: 1  -     lamoTRIgine (LaMICtal) 100 MG tablet; Take 3 tablets by mouth Daily.  Dispense: 225 tablet; Refill: 1    Body mass index is 29.01 kg/mý.   Cardiomyopathy presumed post COVID.  She has done very nicely.  Has cardiology follow-up in October for repeat echocardiogram.  She is more physically active than feels like her symptoms are much improved.  Anxiety and depression overall stable.  She needs a refill of her Valium.  UMAIR completed.  Recheck in 6 months for wellness or sooner if any problems.  She is going to get her lab work done up and since that it sounds like.       Follow Up   Return in about 6 months (around 2/29/2024) for Annual physical.  Patient was given instructions and counseling regarding her condition or for health maintenance advice. Please see specific information pulled into the AVS if appropriate.

## 2023-10-13 DIAGNOSIS — F41.9 ANXIETY: ICD-10-CM

## 2023-10-13 DIAGNOSIS — K58.2 IRRITABLE BOWEL SYNDROME WITH BOTH CONSTIPATION AND DIARRHEA: ICD-10-CM

## 2023-10-13 RX ORDER — DIAZEPAM 5 MG/1
5 TABLET ORAL 2 TIMES DAILY PRN
Qty: 30 TABLET | Refills: 3 | Status: SHIPPED | OUTPATIENT
Start: 2023-10-13

## 2023-10-13 NOTE — TELEPHONE ENCOUNTER
Caller: Jessa Chang    Relationship: Self    Best call back number: [phone]     Requested Prescriptions:   Requested Prescriptions     Pending Prescriptions Disp Refills    diazePAM (VALIUM) 5 MG tablet 30 tablet 3     Sig: Take 1 tablet by mouth 2 (Two) Times a Day As Needed for Anxiety.        Pharmacy where request should be sent: [facility] [id number] [facility] [id number] [place] [address] [phone] [facility] [phone] FX     Last office visit with prescribing clinician: 8/29/2023   Last telemedicine visit with prescribing clinician: Visit date not found   Next office visit with prescribing clinician: Visit date not found     Additional details provided by patient: PLEASE HAVE DR DAVIDSON SEND OVER A NEW PRESCRIPTION FOR PATIENT MEDICATION. SHE IS CHANGING PHARMACY.     PATIENT STATES HER MEDICATION WAS STOLEN ALONG WITH HER PURSE AND SHE IS OUT OF THIS MEDICINE.     Does the patient have less than a 3 day supply:  [x] Yes  [] No    Would you like a call back once the refill request has been completed: [x] Yes [] No    If the office needs to give you a call back, can they leave a voicemail: [x] Yes [] No    Nani Seth Rep   10/13/23 13:49 EDT

## 2023-12-29 RX ORDER — LAMOTRIGINE 100 MG/1
300 TABLET ORAL DAILY
Qty: 225 TABLET | Refills: 1 | Status: SHIPPED | OUTPATIENT
Start: 2023-12-29

## 2023-12-29 NOTE — TELEPHONE ENCOUNTER
Caller: CharleneJessa dennis    Relationship: Self    Best call back number: [phone]  Requested Prescriptions:   Requested Prescriptions     Pending Prescriptions Disp Refills    lamoTRIgine (LaMICtal) 100 MG tablet 225 tablet 1     Sig: Take 3 tablets by mouth Daily.        Pharmacy where request should be sent: [facility] [id number] [facility] [address] [facility] [phone] [facility] [phone] FX     Last office visit with prescribing clinician: 8/29/2023   Last telemedicine visit with prescribing clinician: Visit date not found   Next office visit with prescribing clinician: Visit date not found     Additional details provided by patient: PATIENT HAS A 4 DAY SUPPLY LEFT OF THIS MEDICATION.     Does the patient have less than a 3 day supply:  [] Yes  [x] No    Would you like a call back once the refill request has been completed: [] Yes [x] No    If the office needs to give you a call back, can they leave a voicemail: [] Yes [x] No    Nani Mary Rep   12/29/23 14:54 EST

## 2024-01-22 ENCOUNTER — TELEPHONE (OUTPATIENT)
Dept: INTERNAL MEDICINE | Facility: CLINIC | Age: 59
End: 2024-01-22

## 2024-01-22 NOTE — TELEPHONE ENCOUNTER
alexander     Caller: Jessa Chang    Relationship: Self    Best call back number: [phone]     What medication are you requesting: ARMAND    What are your current symptoms: SINUS INFECTION    How long have you been experiencing symptoms: 1/19/24    Have you had these symptoms before:    [x] Yes  [] No    Have you been treated for these symptoms before:   [x] Yes  [] No    If a prescription is needed, what is your preferred pharmacy and phone number: [facility] [id number] - [facility] [address] AT [facility] - [phone] [facility] [phone] FX     Additional notes:PATIENT WOULD LIKE TO KNOW IF THIS COULD BE CALLED IN

## 2024-04-04 DIAGNOSIS — K58.2 IRRITABLE BOWEL SYNDROME WITH BOTH CONSTIPATION AND DIARRHEA: ICD-10-CM

## 2024-04-04 DIAGNOSIS — F41.9 ANXIETY: ICD-10-CM

## 2024-04-04 RX ORDER — DIAZEPAM 5 MG/1
5 TABLET ORAL 2 TIMES DAILY PRN
Qty: 30 TABLET | Refills: 3 | OUTPATIENT
Start: 2024-04-04

## 2024-04-04 NOTE — TELEPHONE ENCOUNTER
Caller: CharleneJessa dennis    Relationship: Self    Best call back number: [phone]     Requested Prescriptions:   Requested Prescriptions     Pending Prescriptions Disp Refills    diazePAM (VALIUM) 5 MG tablet 30 tablet 3     Sig: Take 1 tablet by mouth 2 (Two) Times a Day As Needed for Anxiety.        Pharmacy where request should be sent: [facility] [id number] [address] [phone] [facility] [phone] FX     Last office visit with prescribing clinician: 8/29/2023   Last telemedicine visit with prescribing clinician: Visit date not found   Next office visit with prescribing clinician: 4/17/2024     Additional details provided by patient: PATIENT HAS APPOINTMENT SCHEDULED FOR 04/17/24, MEDICATION REFILL VISIT.     PATIENT IS OUT OF THIS MEDICATION.     Does the patient have less than a 3 day supply:  [x] Yes  [] No    Would you like a call back once the refill request has been completed: [] Yes [x] No    If the office needs to give you a call back, can they leave a voicemail: [x] Yes [] No    Nani Seth Rep   04/04/24 14:12 EDT

## 2024-04-05 DIAGNOSIS — F41.9 ANXIETY: ICD-10-CM

## 2024-04-05 DIAGNOSIS — K58.2 IRRITABLE BOWEL SYNDROME WITH BOTH CONSTIPATION AND DIARRHEA: ICD-10-CM

## 2024-04-06 RX ORDER — DIAZEPAM 5 MG/1
5 TABLET ORAL 2 TIMES DAILY PRN
Qty: 30 TABLET | OUTPATIENT
Start: 2024-04-06

## 2024-04-14 DIAGNOSIS — F41.9 ANXIETY: ICD-10-CM

## 2024-04-14 DIAGNOSIS — K58.2 IRRITABLE BOWEL SYNDROME WITH BOTH CONSTIPATION AND DIARRHEA: ICD-10-CM

## 2024-04-15 RX ORDER — DIAZEPAM 5 MG/1
5 TABLET ORAL 2 TIMES DAILY PRN
Qty: 30 TABLET | OUTPATIENT
Start: 2024-04-15

## 2024-04-17 ENCOUNTER — OFFICE VISIT (OUTPATIENT)
Dept: INTERNAL MEDICINE | Facility: CLINIC | Age: 59
End: 2024-04-17
Payer: COMMERCIAL

## 2024-04-17 VITALS
HEART RATE: 75 BPM | WEIGHT: 178 LBS | TEMPERATURE: 98.3 F | RESPIRATION RATE: 16 BRPM | OXYGEN SATURATION: 96 % | DIASTOLIC BLOOD PRESSURE: 72 MMHG | HEIGHT: 64 IN | BODY MASS INDEX: 30.39 KG/M2 | SYSTOLIC BLOOD PRESSURE: 118 MMHG

## 2024-04-17 DIAGNOSIS — K58.2 IRRITABLE BOWEL SYNDROME WITH BOTH CONSTIPATION AND DIARRHEA: ICD-10-CM

## 2024-04-17 DIAGNOSIS — E78.49 OTHER HYPERLIPIDEMIA: ICD-10-CM

## 2024-04-17 DIAGNOSIS — U07.1 CARDIOMYOPATHY DUE TO COVID-19 VIRUS: ICD-10-CM

## 2024-04-17 DIAGNOSIS — K21.9 GASTROESOPHAGEAL REFLUX DISEASE WITHOUT ESOPHAGITIS: Primary | ICD-10-CM

## 2024-04-17 DIAGNOSIS — F33.9 EPISODE OF RECURRENT MAJOR DEPRESSIVE DISORDER, UNSPECIFIED DEPRESSION EPISODE SEVERITY: ICD-10-CM

## 2024-04-17 DIAGNOSIS — F41.9 ANXIETY: ICD-10-CM

## 2024-04-17 DIAGNOSIS — I43 CARDIOMYOPATHY DUE TO COVID-19 VIRUS: ICD-10-CM

## 2024-04-17 PROCEDURE — 99214 OFFICE O/P EST MOD 30 MIN: CPT | Performed by: INTERNAL MEDICINE

## 2024-04-17 RX ORDER — DIAZEPAM 5 MG/1
5 TABLET ORAL 2 TIMES DAILY PRN
Qty: 30 TABLET | Refills: 3 | Status: SHIPPED | OUTPATIENT
Start: 2024-04-17

## 2024-04-17 RX ORDER — LAMOTRIGINE 100 MG/1
300 TABLET ORAL DAILY
Qty: 225 TABLET | Refills: 1 | Status: SHIPPED | OUTPATIENT
Start: 2024-04-17

## 2024-04-17 RX ORDER — CARVEDILOL 6.25 MG/1
6.25 TABLET ORAL 2 TIMES DAILY WITH MEALS
Status: SHIPPED | COMMUNITY
Start: 2024-04-17

## 2024-04-17 NOTE — PROGRESS NOTES
"Chief Complaint  Med Refill (Diazepam /Lamotrigine )    Subjective        Jessa Chang presents to [facility]  History of Present Illness  Denies any significant headache, shortness of breath or chest pain.  No visual changes.  Taking medication without complication.   She been doing pretty well overall.  Still seeing cardiology for cardiomyopathy.  Did review her stress test from November 2023.  Prestress ejection fraction 56% post exercise 59%.  She is still on the Aldactone, Entresto and carvedilol.  Is able to walk but still gets pretty breathless with activity  She has gained some weight since previous visit and anxious to try GLP-1 agonist  Objective   Vital Signs:  /72 (BP Location: Left arm, Patient Position: Sitting, Cuff Size: Large Adult)   Pulse 75   Temp 98.3 °F (36.8 °C) (Temporal)   Resp 16   Ht 162.6 cm (64\")   Wt 80.7 kg (178 lb)   SpO2 96%   BMI 30.55 kg/m²   Estimated body mass index is 30.55 kg/m² as calculated from the following:    Height as of this encounter: 162.6 cm (64\").    Weight as of this encounter: 80.7 kg (178 lb).               Physical Exam  Vitals and nursing note reviewed.   Constitutional:       Appearance: Normal appearance.   HENT:      Head: Normocephalic and atraumatic.   Cardiovascular:      Rate and Rhythm: Normal rate and regular rhythm.   Pulmonary:      Effort: Pulmonary effort is normal.      Breath sounds: Normal breath sounds.   Abdominal:      General: Abdomen is flat.      Palpations: Abdomen is soft.   Musculoskeletal:         General: No swelling or deformity.      Cervical back: Neck supple.      Right lower leg: No edema.      Left lower leg: No edema.   Skin:     General: Skin is warm.      Capillary Refill: Capillary refill takes less than 2 seconds.      Findings: No rash.   Neurological:      General: No focal deficit present.      Mental Status: She is alert and oriented to person, place, and " time.   Psychiatric:         Mood and Affect: Mood normal.         Behavior: Behavior normal.         Thought Content: Thought content normal.         Judgment: Judgment normal.        Result Review :          Previous notes reviewed           Assessment and Plan     Diagnoses and all orders for this visit:    1. Gastroesophageal reflux disease without esophagitis (Primary)  -     Comprehensive Metabolic Panel  -     Hemoglobin A1c  -     Lipid Panel With / Chol / HDL Ratio  -     TSH    2. Anxiety  -     diazePAM (VALIUM) 5 MG tablet; Take 1 tablet by mouth 2 (Two) Times a Day As Needed for Anxiety.  Dispense: 30 tablet; Refill: 3  -     Comprehensive Metabolic Panel  -     Hemoglobin A1c  -     Lipid Panel With / Chol / HDL Ratio  -     TSH    3. Irritable bowel syndrome with both constipation and diarrhea  -     diazePAM (VALIUM) 5 MG tablet; Take 1 tablet by mouth 2 (Two) Times a Day As Needed for Anxiety.  Dispense: 30 tablet; Refill: 3    4. Other hyperlipidemia  -     Comprehensive Metabolic Panel  -     Hemoglobin A1c  -     Lipid Panel With / Chol / HDL Ratio  -     TSH    5. Episode of recurrent major depressive disorder, unspecified depression episode severity  -     Comprehensive Metabolic Panel  -     Hemoglobin A1c  -     Lipid Panel With / Chol / HDL Ratio  -     TSH    6. 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  Dispense: 1.5 mL; Refill: 2  -     lamoTRIgine (LaMICtal) 100 MG tablet; Take 3 tablets by mouth Daily.  Dispense: 225 tablet; Refill: 1    Anxiety and depression.  Continue with the current dose of lamotrigine and takes the diazepam as  needed.  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  But has become a very busy endeavor apparently  COVID cardiomyopathy.  Last ejection fraction on stress testing looks good.  She really was not sure if cardiology was going to wean any medications.  Currently she is on the Entresto, carvedilol and Aldactone.  Weight gain.  She is interested in GLP-1 agonist.  Prescription sent in, if insurance denies then we can try compounding pharmacy         Follow Up     Return in about 3 months (around 7/17/2024).  Patient was given instructions and counseling regarding her condition or for health maintenance advice. Please see specific information pulled into the AVS if appropriate.

## 2024-04-18 LAB
ALBUMIN SERPL-MCNC: 4.5 G/DL (ref 3.5–5.2)
ALBUMIN/GLOB SERPL: 1.9 G/DL
ALP SERPL-CCNC: 100 U/L (ref 39–117)
ALT SERPL-CCNC: 20 U/L (ref 1–33)
AST SERPL-CCNC: 19 U/L (ref 1–32)
BILIRUB SERPL-MCNC: 0.3 MG/DL (ref 0–1.2)
BUN SERPL-MCNC: 15 MG/DL (ref 6–20)
BUN/CREAT SERPL: 16.7 (ref 7–25)
CALCIUM SERPL-MCNC: 9.8 MG/DL (ref 8.6–10.5)
CHLORIDE SERPL-SCNC: 101 MMOL/L (ref 98–107)
CHOLEST SERPL-MCNC: 246 MG/DL (ref 0–200)
CHOLEST/HDLC SERPL: 4.64 {RATIO}
CO2 SERPL-SCNC: 26 MMOL/L (ref 22–29)
CREAT SERPL-MCNC: 0.9 MG/DL (ref 0.57–1)
EGFRCR SERPLBLD CKD-EPI 2021: 74.3 ML/MIN/1.73
GLOBULIN SER CALC-MCNC: 2.4 GM/DL
GLUCOSE SERPL-MCNC: 83 MG/DL (ref 65–99)
HBA1C MFR BLD: 5.5 % (ref 4.8–5.6)
HDLC SERPL-MCNC: 53 MG/DL (ref 40–60)
LDLC SERPL CALC-MCNC: 152 MG/DL (ref 0–100)
POTASSIUM SERPL-SCNC: 4.5 MMOL/L (ref 3.5–5.2)
PROT SERPL-MCNC: 6.9 G/DL (ref 6–8.5)
SODIUM SERPL-SCNC: 139 MMOL/L (ref 136–145)
TRIGL SERPL-MCNC: 228 MG/DL (ref 0–150)
TSH SERPL DL<=0.005 MIU/L-ACNC: 4.69 UIU/ML (ref 0.27–4.2)
VLDLC SERPL CALC-MCNC: 41 MG/DL (ref 5–40)

## 2024-04-19 ENCOUNTER — TELEPHONE (OUTPATIENT)
Dept: INTERNAL MEDICINE | Facility: CLINIC | Age: 59
End: 2024-04-19

## 2024-04-19 NOTE — TELEPHONE ENCOUNTER
Caller: Jessa Chang    Relationship to patient: Self    Best call back number: [phone]     Patient is needing: PRIOR AUTHORIZATION FOR OZEMPIC

## 2024-04-20 LAB
T4 FREE SERPL-MCNC: 0.97 NG/DL (ref 0.93–1.7)
WRITTEN AUTHORIZATION: NORMAL

## 2024-06-17 ENCOUNTER — TELEPHONE (OUTPATIENT)
Dept: INTERNAL MEDICINE | Facility: CLINIC | Age: 59
End: 2024-06-17
Payer: COMMERCIAL

## 2024-07-02 ENCOUNTER — OFFICE VISIT (OUTPATIENT)
Dept: INTERNAL MEDICINE | Facility: CLINIC | Age: 59
End: 2024-07-02
Payer: COMMERCIAL

## 2024-07-02 VITALS
RESPIRATION RATE: 16 BRPM | DIASTOLIC BLOOD PRESSURE: 72 MMHG | HEART RATE: 97 BPM | BODY MASS INDEX: 29.94 KG/M2 | SYSTOLIC BLOOD PRESSURE: 120 MMHG | HEIGHT: 64 IN | WEIGHT: 175.4 LBS | OXYGEN SATURATION: 97 %

## 2024-07-02 DIAGNOSIS — J45.901 REACTIVE AIRWAY DISEASE WITH ACUTE EXACERBATION, UNSPECIFIED ASTHMA SEVERITY, UNSPECIFIED WHETHER PERSISTENT: Primary | ICD-10-CM

## 2024-07-02 DIAGNOSIS — I43 CARDIOMYOPATHY DUE TO COVID-19 VIRUS: ICD-10-CM

## 2024-07-02 DIAGNOSIS — R63.8 INCREASED BMI: ICD-10-CM

## 2024-07-02 DIAGNOSIS — U07.1 CARDIOMYOPATHY DUE TO COVID-19 VIRUS: ICD-10-CM

## 2024-07-02 PROCEDURE — 99214 OFFICE O/P EST MOD 30 MIN: CPT | Performed by: INTERNAL MEDICINE

## 2024-07-02 RX ORDER — HYDROCODONE POLISTIREX AND CHLORPHENIRAMINE POLISTIREX 10; 8 MG/5ML; MG/5ML
5 SUSPENSION, EXTENDED RELEASE ORAL EVERY 12 HOURS PRN
Qty: 60 ML | Refills: 0 | Status: SHIPPED | OUTPATIENT
Start: 2024-07-02

## 2024-07-02 NOTE — PROGRESS NOTES
"Chief Complaint  Follow-up (Routine f/u)    Subjective        Jessa Chang presents to [facility]  History of Present Illness  For follow-up on the mounjaro.  She just started but has seemed to do better with that than the Ozempic.  No epigastric pain or diarrhea or constipation.  Unfortunately she developed an upper respiratory illness recently and was seen in the immediate care center.  Started on amoxicillin and feeling a little better it seems like but still coughing a lot.  Presumed viral cardiomyopathy.  Overall stable.  No medication changes followed up in [place].  Objective   Vital Signs:  /72   Pulse 97   Resp 16   Ht 162.6 cm (64\")   Wt 79.6 kg (175 lb 6.4 oz)   SpO2 97%   BMI 30.11 kg/m²   Estimated body mass index is 30.11 kg/m² as calculated from the following:    Height as of this encounter: 162.6 cm (64\").    Weight as of this encounter: 79.6 kg (175 lb 6.4 oz).             Physical Exam  Vitals and nursing note reviewed.   Constitutional:       Appearance: Normal appearance.   HENT:      Head: Normocephalic and atraumatic.   Cardiovascular:      Rate and Rhythm: Normal rate and regular rhythm.   Pulmonary:      Effort: Pulmonary effort is normal.      Breath sounds: Normal breath sounds.   Abdominal:      General: Abdomen is flat.      Palpations: Abdomen is soft.   Musculoskeletal:         General: No swelling or deformity.      Cervical back: Neck supple.      Right lower leg: No edema.      Left lower leg: No edema.   Skin:     General: Skin is warm.      Capillary Refill: Capillary refill takes less than 2 seconds.      Findings: No rash.   Neurological:      General: No focal deficit present.      Mental Status: She is alert and oriented to person, place, and time.        Result Review :          Previous notes reviewed           Assessment and Plan     Diagnoses and all orders for this visit:    1. Reactive airway disease with " acute exacerbation, unspecified asthma severity, unspecified whether persistent (Primary)  -     Hydrocod Lalo-Chlorphe Lalo ER (TUSSIONEX PENNKINETIC) 10-8 MG/5ML ER suspension; Take 5 mL by mouth Every 12 (Twelve) Hours As Needed for Cough.  Dispense: 60 mL; Refill: 0    2. 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 Increased BMI    Reactive airway disease.  She is doing a little bit better.  No pneumonia or wheezing on examination.  Tussionex prescription given for cough she is having a lot of trouble sleeping.  Stable cardiomyopathy.  Probably has echocardiogram due soon up in [place]  Increased BMI.  Just started on the mounjaro with so far good results she thinks.  No significant side effects yet but she is only just started the new medication.  Recheck in 3 months or sooner if any problems  BMI is >= 30 and <35. (Class 1 Obesity). The following options were offered after discussion;: exercise counseling/recommendations and nutrition counseling/recommendations            Follow Up     Return in about 3 months (around 10/2/2024).  Patient was given instructions and counseling regarding her condition or for health maintenance advice. Please see specific information pulled into the AVS if appropriate.